# Patient Record
Sex: FEMALE | Race: BLACK OR AFRICAN AMERICAN | NOT HISPANIC OR LATINO | Employment: FULL TIME | URBAN - METROPOLITAN AREA
[De-identification: names, ages, dates, MRNs, and addresses within clinical notes are randomized per-mention and may not be internally consistent; named-entity substitution may affect disease eponyms.]

---

## 2023-02-23 PROBLEM — J34.89 NASAL OBSTRUCTION: Status: ACTIVE | Noted: 2023-02-23

## 2023-02-23 PROBLEM — D25.9 FIBROID UTERUS: Status: ACTIVE | Noted: 2023-02-23

## 2023-02-23 PROBLEM — M25.569 KNEE PAIN: Status: ACTIVE | Noted: 2023-02-23

## 2023-02-23 PROBLEM — M22.41 CHONDROMALACIA OF RIGHT PATELLA: Status: ACTIVE | Noted: 2023-02-23

## 2023-02-23 PROBLEM — R07.9 CHEST PAIN: Status: ACTIVE | Noted: 2023-02-23

## 2023-02-23 PROBLEM — R53.81 MALAISE AND FATIGUE: Status: ACTIVE | Noted: 2023-02-23

## 2023-02-23 PROBLEM — R09.A2 GLOBUS SENSATION: Status: ACTIVE | Noted: 2023-02-23

## 2023-02-23 PROBLEM — D50.9 IRON DEFICIENCY ANEMIA: Status: ACTIVE | Noted: 2023-02-23

## 2023-02-23 PROBLEM — M21.41 ACQUIRED PES PLANOVALGUS OF RIGHT FOOT: Status: ACTIVE | Noted: 2023-02-23

## 2023-02-23 PROBLEM — J30.1 SEASONAL ALLERGIC RHINITIS DUE TO POLLEN: Status: ACTIVE | Noted: 2023-02-23

## 2023-02-23 PROBLEM — K21.9 GERD (GASTROESOPHAGEAL REFLUX DISEASE): Status: ACTIVE | Noted: 2023-02-23

## 2023-02-23 PROBLEM — J01.90 ACUTE SINUSITIS: Status: ACTIVE | Noted: 2023-02-23

## 2023-02-23 PROBLEM — G25.81 RESTLESS LEG SYNDROME: Status: ACTIVE | Noted: 2023-02-23

## 2023-02-23 PROBLEM — J31.0 CHRONIC RHINITIS: Status: ACTIVE | Noted: 2023-02-23

## 2023-02-23 PROBLEM — G89.29 NECK PAIN, CHRONIC: Status: ACTIVE | Noted: 2023-02-23

## 2023-02-23 PROBLEM — M25.562 LEFT KNEE PAIN: Status: ACTIVE | Noted: 2023-02-23

## 2023-02-23 PROBLEM — J34.89 NASAL AND SINUS DISCHARGE: Status: ACTIVE | Noted: 2023-02-23

## 2023-02-23 PROBLEM — I10 ESSENTIAL HYPERTENSION: Status: ACTIVE | Noted: 2023-02-23

## 2023-02-23 PROBLEM — M54.50 LUMBAR PAIN: Status: ACTIVE | Noted: 2023-02-23

## 2023-02-23 PROBLEM — J34.89 NASAL CRUSTING: Status: ACTIVE | Noted: 2023-02-23

## 2023-02-23 PROBLEM — R10.11 ABDOMINAL PAIN, RUQ (RIGHT UPPER QUADRANT): Status: ACTIVE | Noted: 2023-02-23

## 2023-02-23 PROBLEM — E66.9 OBESITY (BMI 30-39.9): Status: ACTIVE | Noted: 2023-02-23

## 2023-02-23 PROBLEM — M25.561 RIGHT KNEE PAIN: Status: ACTIVE | Noted: 2023-02-23

## 2023-02-23 PROBLEM — M76.829 POSTERIOR TIBIAL TENDON DYSFUNCTION: Status: ACTIVE | Noted: 2023-02-23

## 2023-02-23 PROBLEM — J30.81 NON-SEASONAL ALLERGIC RHINITIS DUE TO ANIMAL HAIR AND DANDER: Status: ACTIVE | Noted: 2023-02-23

## 2023-02-23 PROBLEM — M22.42 CHONDROMALACIA OF LEFT PATELLA: Status: ACTIVE | Noted: 2023-02-23

## 2023-02-23 PROBLEM — R53.83 MALAISE AND FATIGUE: Status: ACTIVE | Noted: 2023-02-23

## 2023-02-23 PROBLEM — E66.9 CLASS 2 OBESITY WITH BODY MASS INDEX (BMI) OF 35.0 TO 35.9 IN ADULT: Status: ACTIVE | Noted: 2023-02-23

## 2023-02-23 PROBLEM — M17.12 ARTHRITIS OF KNEE, LEFT: Status: ACTIVE | Noted: 2023-02-23

## 2023-02-23 PROBLEM — M54.2 NECK PAIN, CHRONIC: Status: ACTIVE | Noted: 2023-02-23

## 2023-02-23 PROBLEM — E04.9 THYROID ENLARGED: Status: ACTIVE | Noted: 2023-02-23

## 2023-02-23 PROBLEM — E66.812 CLASS 2 OBESITY WITH BODY MASS INDEX (BMI) OF 35.0 TO 35.9 IN ADULT: Status: ACTIVE | Noted: 2023-02-23

## 2023-02-23 PROBLEM — N92.0 MENORRHAGIA: Status: ACTIVE | Noted: 2023-02-23

## 2023-02-23 PROBLEM — N76.0 VAGINITIS: Status: ACTIVE | Noted: 2023-02-23

## 2023-02-23 PROBLEM — J30.89 ALLERGIC RHINITIS DUE TO DUST MITE: Status: ACTIVE | Noted: 2023-02-23

## 2023-02-23 PROBLEM — R09.81 NASAL CONGESTION: Status: ACTIVE | Noted: 2023-02-23

## 2023-02-23 PROBLEM — N93.9 ABNORMAL UTERINE BLEEDING (AUB): Status: ACTIVE | Noted: 2023-02-23

## 2023-02-23 PROBLEM — R03.0 ELEVATED BP WITHOUT DIAGNOSIS OF HYPERTENSION: Status: ACTIVE | Noted: 2023-02-23

## 2023-02-23 PROBLEM — M21.42 ACQUIRED PES PLANOVALGUS OF LEFT FOOT: Status: ACTIVE | Noted: 2023-02-23

## 2023-02-23 PROBLEM — M21.162 ACQUIRED GENU VARUM OF LEFT LOWER EXTREMITY: Status: ACTIVE | Noted: 2023-02-23

## 2023-02-23 RX ORDER — FLUTICASONE PROPIONATE 50 MCG
2 SPRAY, SUSPENSION (ML) NASAL DAILY
COMMUNITY
Start: 2021-10-28

## 2023-02-23 RX ORDER — MAGNESIUM CARB/ALUMINUM HYDROX 105-160MG
1 TABLET,CHEWABLE ORAL EVERY 12 HOURS PRN
COMMUNITY
End: 2024-03-08 | Stop reason: SDUPTHER

## 2023-02-23 RX ORDER — SEMAGLUTIDE 1.34 MG/ML
0.25 INJECTION, SOLUTION SUBCUTANEOUS
COMMUNITY
End: 2023-03-23

## 2023-02-23 RX ORDER — HYDROCHLOROTHIAZIDE 25 MG/1
25 TABLET ORAL DAILY
COMMUNITY
End: 2023-05-26 | Stop reason: SINTOL

## 2023-02-23 RX ORDER — EPINEPHRINE 0.3 MG/.3ML
1 INJECTION SUBCUTANEOUS
COMMUNITY
Start: 2021-10-28

## 2023-03-21 ASSESSMENT — ENCOUNTER SYMPTOMS
SHORTNESS OF BREATH: 0
BLURRED VISION: 0
PND: 0
PALPITATIONS: 0
HYPERTENSION: 1
NECK PAIN: 0
ORTHOPNEA: 0
HEADACHES: 0
SWEATS: 0

## 2023-03-23 ENCOUNTER — OFFICE VISIT (OUTPATIENT)
Dept: PRIMARY CARE | Facility: CLINIC | Age: 48
End: 2023-03-23
Payer: COMMERCIAL

## 2023-03-23 ENCOUNTER — LAB (OUTPATIENT)
Dept: LAB | Facility: LAB | Age: 48
End: 2023-03-23
Payer: COMMERCIAL

## 2023-03-23 VITALS
HEART RATE: 96 BPM | SYSTOLIC BLOOD PRESSURE: 130 MMHG | RESPIRATION RATE: 16 BRPM | HEIGHT: 64 IN | DIASTOLIC BLOOD PRESSURE: 81 MMHG | WEIGHT: 207 LBS | BODY MASS INDEX: 35.34 KG/M2

## 2023-03-23 DIAGNOSIS — K21.00 GASTROESOPHAGEAL REFLUX DISEASE WITH ESOPHAGITIS WITHOUT HEMORRHAGE: ICD-10-CM

## 2023-03-23 DIAGNOSIS — I10 ESSENTIAL HYPERTENSION: ICD-10-CM

## 2023-03-23 DIAGNOSIS — R09.A2 GLOBUS SENSATION: ICD-10-CM

## 2023-03-23 DIAGNOSIS — K21.00 GASTROESOPHAGEAL REFLUX DISEASE WITH ESOPHAGITIS WITHOUT HEMORRHAGE: Primary | ICD-10-CM

## 2023-03-23 DIAGNOSIS — E66.9 OBESITY, CLASS II, BMI 35-39.9: ICD-10-CM

## 2023-03-23 PROBLEM — R03.0 ELEVATED BP WITHOUT DIAGNOSIS OF HYPERTENSION: Status: RESOLVED | Noted: 2023-02-23 | Resolved: 2023-03-23

## 2023-03-23 PROBLEM — Z91.013 FISH ALLERGY: Status: ACTIVE | Noted: 2023-03-23

## 2023-03-23 LAB
ANION GAP IN SER/PLAS: 11 MMOL/L (ref 10–20)
CALCIUM (MG/DL) IN SER/PLAS: 9.8 MG/DL (ref 8.6–10.6)
CARBON DIOXIDE, TOTAL (MMOL/L) IN SER/PLAS: 31 MMOL/L (ref 21–32)
CHLORIDE (MMOL/L) IN SER/PLAS: 99 MMOL/L (ref 98–107)
CREATININE (MG/DL) IN SER/PLAS: 0.84 MG/DL (ref 0.5–1.05)
GFR FEMALE: 86 ML/MIN/1.73M2
GLUCOSE (MG/DL) IN SER/PLAS: 97 MG/DL (ref 74–99)
POTASSIUM (MMOL/L) IN SER/PLAS: 4.3 MMOL/L (ref 3.5–5.3)
SODIUM (MMOL/L) IN SER/PLAS: 137 MMOL/L (ref 136–145)
UREA NITROGEN (MG/DL) IN SER/PLAS: 13 MG/DL (ref 6–23)

## 2023-03-23 PROCEDURE — 1036F TOBACCO NON-USER: CPT | Performed by: NURSE PRACTITIONER

## 2023-03-23 PROCEDURE — 36415 COLL VENOUS BLD VENIPUNCTURE: CPT

## 2023-03-23 PROCEDURE — 3079F DIAST BP 80-89 MM HG: CPT | Performed by: NURSE PRACTITIONER

## 2023-03-23 PROCEDURE — 3008F BODY MASS INDEX DOCD: CPT | Performed by: NURSE PRACTITIONER

## 2023-03-23 PROCEDURE — 99214 OFFICE O/P EST MOD 30 MIN: CPT | Performed by: NURSE PRACTITIONER

## 2023-03-23 PROCEDURE — 83013 H PYLORI (C-13) BREATH: CPT

## 2023-03-23 PROCEDURE — 3075F SYST BP GE 130 - 139MM HG: CPT | Performed by: NURSE PRACTITIONER

## 2023-03-23 PROCEDURE — 80048 BASIC METABOLIC PNL TOTAL CA: CPT

## 2023-03-23 RX ORDER — LANSOPRAZOLE 30 MG/1
30 TABLET, ORALLY DISINTEGRATING, DELAYED RELEASE ORAL DAILY
Qty: 30 TABLET | Refills: 1 | Status: SHIPPED | OUTPATIENT
Start: 2023-03-23 | End: 2023-05-22

## 2023-03-23 RX ORDER — MAGNESIUM CARB/ALUMINUM HYDROX 105-160MG
1 TABLET,CHEWABLE ORAL EVERY 12 HOURS
COMMUNITY
Start: 2022-11-28 | End: 2023-03-23 | Stop reason: SDUPTHER

## 2023-03-23 ASSESSMENT — ENCOUNTER SYMPTOMS
PND: 0
HYPERTENSION: 1
SHORTNESS OF BREATH: 0
SWEATS: 0
ORTHOPNEA: 0
PALPITATIONS: 0
NECK PAIN: 0
HEADACHES: 0
BLURRED VISION: 0

## 2023-03-23 ASSESSMENT — PATIENT HEALTH QUESTIONNAIRE - PHQ9
SUM OF ALL RESPONSES TO PHQ9 QUESTIONS 1 AND 2: 0
1. LITTLE INTEREST OR PLEASURE IN DOING THINGS: NOT AT ALL
2. FEELING DOWN, DEPRESSED OR HOPELESS: NOT AT ALL

## 2023-03-23 ASSESSMENT — PAIN SCALES - GENERAL: PAINLEVEL: 0-NO PAIN

## 2023-03-23 NOTE — PATIENT INSTRUCTIONS
HTN: Improved BP control. Continue hydrochlorothiazide 25mg daily. Continue to follow a low salt diet. Will reevaluate after pending uterine procedure (3-4 months?)    GERD: Trial of Prevacid 30mg daily. Can continue Gaviscon for symptom control. Avoid trigger foods and tight clothing.  H. Pylori testing ordered.   Follow up EGD if testing negative and/or unable to take Prevacid.

## 2023-03-23 NOTE — PROGRESS NOTES
"Subjective   Patient ID: Radha Mckeon is a 47 y.o. female who presents for Hypertension.    Presents for BP check and followup.  Has been impoving. Home BP readings readings 130/80 at home.  Headaches have resolved.    Has not yet had uterine embolization. Had MRI since last seen, has yet to schedule procedure.    Has noticed worsening of heartburn with globus sensation. Intermittent chest pain. Typically well controlled with Gaviscon. Not currently responding. Last EGD 2020. Was advised to repeat in few years.     Hypertension  This is a new problem. The current episode started 1 to 4 weeks ago. The problem has been rapidly improving since onset. The problem is controlled. Pertinent negatives include no anxiety, blurred vision, chest pain, headaches, malaise/fatigue, neck pain, orthopnea, palpitations, peripheral edema, PND, shortness of breath or sweats. Agents associated with hypertension include NSAIDs. Risk factors for coronary artery disease include family history, obesity and sedentary lifestyle. Compliance problems include exercise.         Review of Systems   Constitutional:  Negative for malaise/fatigue.   Eyes:  Negative for blurred vision.   Respiratory:  Negative for shortness of breath.    Cardiovascular:  Negative for chest pain, palpitations, orthopnea and PND.   Musculoskeletal:  Negative for neck pain.   Neurological:  Negative for headaches.       Objective   /81 (BP Location: Right arm, Patient Position: Sitting, BP Cuff Size: Adult)   Pulse 96   Resp 16   Ht 1.626 m (5' 4\")   Wt 93.9 kg (207 lb)   BMI 35.53 kg/m²     Physical Exam  Vitals and nursing note reviewed.   Constitutional:       General: She is not in acute distress.     Appearance: Normal appearance. She is obese. She is not ill-appearing.   Cardiovascular:      Rate and Rhythm: Normal rate and regular rhythm.      Heart sounds: Normal heart sounds. No murmur heard.  Pulmonary:      Effort: Pulmonary effort is normal. No " respiratory distress.      Breath sounds: Normal breath sounds.   Abdominal:      General: Abdomen is protuberant. Bowel sounds are normal.      Palpations: Abdomen is soft.      Tenderness: There is no abdominal tenderness. There is no guarding.   Musculoskeletal:         General: No swelling.   Lymphadenopathy:      Cervical: No cervical adenopathy.   Psychiatric:         Mood and Affect: Mood normal.         Assessment/Plan   Diagnoses and all orders for this visit:  Gastroesophageal reflux disease with esophagitis without hemorrhage  -     lansoprazole (Prevacid SoluTab) 30 mg disintegrating tablet; Take 1 tablet (30 mg) by mouth once daily. Dissolve on tongue before swallowing particles; do not chew, cut, break, or swallow whole.  -     H. Pylori Breath Test; Future  Globus sensation  -     lansoprazole (Prevacid SoluTab) 30 mg disintegrating tablet; Take 1 tablet (30 mg) by mouth once daily. Dissolve on tongue before swallowing particles; do not chew, cut, break, or swallow whole.  -     H. Pylori Breath Test; Future  Essential hypertension  -     Basic metabolic panel; Future  -     Follow Up In [facility] - PCP; Future  Obesity, Class II, BMI 35-39.9    HTN: Improved BP control. Continue hydrochlorothiazide 25mg daily. Continue to follow a low salt diet. Will reevaluate after pending uterine procedure (3-4 months?)    GERD: Trial of Prevacid 30mg daily. Can continue Gaviscon for symptom control. Avoid trigger foods and tight clothing.  H. Pylori testing ordered.   Follow up EGD if testing negative and/or unable to take Prevacid.

## 2023-03-24 LAB — H. PYLORI UBIT: NEGATIVE

## 2023-05-26 ENCOUNTER — OFFICE VISIT (OUTPATIENT)
Dept: PRIMARY CARE | Facility: CLINIC | Age: 48
End: 2023-05-26
Payer: COMMERCIAL

## 2023-05-26 VITALS
OXYGEN SATURATION: 100 % | TEMPERATURE: 97.6 F | WEIGHT: 207 LBS | HEIGHT: 64 IN | BODY MASS INDEX: 35.34 KG/M2 | DIASTOLIC BLOOD PRESSURE: 64 MMHG | SYSTOLIC BLOOD PRESSURE: 98 MMHG | HEART RATE: 71 BPM

## 2023-05-26 DIAGNOSIS — I10 ESSENTIAL HYPERTENSION, BENIGN: Primary | ICD-10-CM

## 2023-05-26 DIAGNOSIS — L30.9 DERMATITIS: ICD-10-CM

## 2023-05-26 DIAGNOSIS — I10 ESSENTIAL HYPERTENSION: ICD-10-CM

## 2023-05-26 PROCEDURE — 3078F DIAST BP <80 MM HG: CPT | Performed by: FAMILY MEDICINE

## 2023-05-26 PROCEDURE — 3008F BODY MASS INDEX DOCD: CPT | Performed by: FAMILY MEDICINE

## 2023-05-26 PROCEDURE — 3074F SYST BP LT 130 MM HG: CPT | Performed by: FAMILY MEDICINE

## 2023-05-26 PROCEDURE — 1036F TOBACCO NON-USER: CPT | Performed by: FAMILY MEDICINE

## 2023-05-26 PROCEDURE — 99214 OFFICE O/P EST MOD 30 MIN: CPT | Performed by: FAMILY MEDICINE

## 2023-05-26 RX ORDER — LOSARTAN POTASSIUM 25 MG/1
25 TABLET ORAL DAILY
Qty: 30 TABLET | Refills: 2 | Status: SHIPPED | OUTPATIENT
Start: 2023-05-26

## 2023-05-26 RX ORDER — HYDROXYZINE HYDROCHLORIDE 10 MG/1
10 TABLET, FILM COATED ORAL EVERY 8 HOURS PRN
Qty: 30 TABLET | Refills: 0 | Status: SHIPPED | OUTPATIENT
Start: 2023-05-26

## 2023-05-26 RX ORDER — TRIAMCINOLONE ACETONIDE 1 MG/G
OINTMENT TOPICAL 2 TIMES DAILY PRN
Qty: 15 G | Refills: 0 | Status: SHIPPED | OUTPATIENT
Start: 2023-05-26 | End: 2023-09-23

## 2023-05-26 NOTE — ASSESSMENT & PLAN NOTE
Suspect may be drug reaction as she noted shortly after starting hydrochlorothiazide  Stop the medication  Monitor rash  Can use Triamcinolone topically, avoid use greater than 10 days to avoid skin change  Can use hydroxyzine for nighttime pruritus, can cause drowsiness  Follow up if this fails to improve or resolve

## 2023-05-26 NOTE — PROGRESS NOTES
"Subjective   Patient ID: Radha Mckeon is a 47 y.o. female who presents for Follow-up (RASH ON ARMS AND NECK. STS THAT COULD BE FROM BP MED hydrochlorothiazide. ).    HPI   Noticed rash on forearms and left neck.  Started a couple weeks after starting hydrochlorothiazide.  NO other new medications, foods, detergents or lotions. Denies any other changes.    Of note, has not taken hydrochlorothiazide in last 2 days.    Review of Systems  Negative unless noted in HPI    Objective   BP 98/64 (BP Location: Left arm, Patient Position: Sitting)   Pulse 71   Temp 36.4 °C (97.6 °F) (Temporal)   Ht 1.626 m (5' 4.02\")   Wt 93.9 kg (207 lb)   SpO2 100%   BMI 35.51 kg/m²     Physical Exam  Constitutional:       Appearance: Normal appearance.   Skin:     Comments: Papular rash noted on right upper arm, left forearm and left neck   Neurological:      Mental Status: She is alert.         Assessment/Plan   Problem List Items Addressed This Visit          Circulatory    Essential hypertension, benign - Primary     Will have pt stop hydrochlorothiazide and monitor home Bps  If BP is >130/80 then start Losartan 25mg daily and follow up in 3 months to reassess renal function         Relevant Medications    losartan (Cozaar) 25 mg tablet       Infectious/Inflammatory    Dermatitis     Suspect may be drug reaction as she noted shortly after starting hydrochlorothiazide  Stop the medication  Monitor rash  Can use Triamcinolone topically, avoid use greater than 10 days to avoid skin change  Can use hydroxyzine for nighttime pruritus, can cause drowsiness  Follow up if this fails to improve or resolve         Relevant Medications    triamcinolone (Kenalog) 0.1 % ointment    hydrOXYzine HCL (Atarax) 10 mg tablet          "

## 2023-05-26 NOTE — ASSESSMENT & PLAN NOTE
Will have pt stop hydrochlorothiazide and monitor home Bps  If BP is >130/80 then start Losartan 25mg daily and follow up in 3 months to reassess renal function

## 2023-06-08 ENCOUNTER — HOSPITAL ENCOUNTER (OUTPATIENT)
Dept: DATA CONVERSION | Facility: HOSPITAL | Age: 48
End: 2023-06-08
Attending: RADIOLOGY | Admitting: RADIOLOGY
Payer: COMMERCIAL

## 2023-06-08 DIAGNOSIS — N93.9 ABNORMAL UTERINE AND VAGINAL BLEEDING, UNSPECIFIED: ICD-10-CM

## 2023-06-08 DIAGNOSIS — I74.8 EMBOLISM AND THROMBOSIS OF OTHER ARTERIES (MULTI): ICD-10-CM

## 2023-06-08 LAB
ANION GAP IN SER/PLAS: 11 MMOL/L (ref 10–20)
CALCIUM (MG/DL) IN SER/PLAS: 8.9 MG/DL (ref 8.6–10.3)
CARBON DIOXIDE, TOTAL (MMOL/L) IN SER/PLAS: 24 MMOL/L (ref 21–32)
CHLORIDE (MMOL/L) IN SER/PLAS: 104 MMOL/L (ref 98–107)
CREATININE (MG/DL) IN SER/PLAS: 0.9 MG/DL (ref 0.5–1.05)
ERYTHROCYTE DISTRIBUTION WIDTH (RATIO) BY AUTOMATED COUNT: 13.4 % (ref 11.5–14.5)
ERYTHROCYTE MEAN CORPUSCULAR HEMOGLOBIN CONCENTRATION (G/DL) BY AUTOMATED: 31.5 G/DL (ref 32–36)
ERYTHROCYTE MEAN CORPUSCULAR VOLUME (FL) BY AUTOMATED COUNT: 82 FL (ref 80–100)
ERYTHROCYTES (10*6/UL) IN BLOOD BY AUTOMATED COUNT: 4.82 X10E12/L (ref 4–5.2)
GFR FEMALE: 79 ML/MIN/1.73M2
GLUCOSE (MG/DL) IN SER/PLAS: 105 MG/DL (ref 74–99)
HEMATOCRIT (%) IN BLOOD BY AUTOMATED COUNT: 39.4 % (ref 36–46)
HEMOGLOBIN (G/DL) IN BLOOD: 12.4 G/DL (ref 12–16)
LEUKOCYTES (10*3/UL) IN BLOOD BY AUTOMATED COUNT: 3.6 X10E9/L (ref 4.4–11.3)
PLATELETS (10*3/UL) IN BLOOD AUTOMATED COUNT: 424 X10E9/L (ref 150–450)
POTASSIUM (MMOL/L) IN SER/PLAS: 3.9 MMOL/L (ref 3.5–5.3)
SODIUM (MMOL/L) IN SER/PLAS: 135 MMOL/L (ref 136–145)
UREA NITROGEN (MG/DL) IN SER/PLAS: 12 MG/DL (ref 6–23)

## 2023-09-30 NOTE — H&P
History of Present Illness:   Pregnant/Lactating:  ·  Are You Pregnant no   ·  Are You Currently Breastfeeding no     History Present Illness:  Reason for surgery: Uterine fibroid embolization   HPI:    47 year old Female presents for a UFE. Has a large intramural leiomyoma causing her abnormal uterine bleeding and urinary incontinence.    Allergies:        Allergies:  ·  famotidine : Throat Swelling, Hives/Urticaria  ·  Latex : Hives/Urticaria  ·  Bananas : Rash  ·  Fish : Rash    Home Medication Review:   Home Medications Reviewed: yes     Impression/Procedure:   ·  Impression and Planned Procedure: Uterine fibroid embolization       ERAS (Enhanced Recovery After Surgery):  ·  ERAS Patient: no       Physical Exam by System:    Respiratory/Thorax: Patent airways, no respiratory  distress   Cardiovascular: Regular, rate and rhythm   Gastrointestinal: Ascites, non tender   Genitourinary: Ramirez catheter   Neurological: alert and oriented x3   Skin: Warm and dry, no lesions, no rashes     Airway/Sedation Assessment:  ·  Emotional Status calm   ·  Neurologic alert & oriented x 3   ·  Respiratory clear to auscultation   ·  Cardiovascular rhythm & rate regular   ·  GI/ soft, nontender     · Pulses present: Pedal Left, Pedal Right, Radial Left, Radial Right     ·  Mouth Opening OK yes   ·  Neck Flexibility OK yes   ·  Loose Teeth no   ·  Oropharyngeal Classification Class III   ·  ASA PS Classification ASA I   ·  Sedation Plan moderate sedation     Consent:   COVID-19 Consent:  ·  COVID-19 Risk Consent Surgeon has reviewed key risks related to the risk of zee COVID-19 and if they contract COVID-19 what the risks are.       Electronic Signatures:  Fabio Edmondson (APRN-CNP)  (Signed 08-Jun-2023 09:22)   Authored: History of Present Illness, Allergies, Home  Medication Review, Impression/Procedure, ERAS, Physical Exam, Consent, Note Completion      Last Updated: 08-Jun-2023 09:22 by Fabio Edmondson (APRN-CNP)

## 2023-12-15 ENCOUNTER — APPOINTMENT (OUTPATIENT)
Dept: PRIMARY CARE | Facility: CLINIC | Age: 48
End: 2023-12-15
Payer: COMMERCIAL

## 2024-01-24 ENCOUNTER — OFFICE VISIT (OUTPATIENT)
Dept: DERMATOLOGY | Facility: CLINIC | Age: 49
End: 2024-01-24
Payer: COMMERCIAL

## 2024-01-24 DIAGNOSIS — L30.2 ID REACTION: ICD-10-CM

## 2024-01-24 DIAGNOSIS — R23.4 DESQUAMATED SKIN: Primary | ICD-10-CM

## 2024-01-24 PROCEDURE — 99203 OFFICE O/P NEW LOW 30 MIN: CPT | Performed by: DERMATOLOGY

## 2024-01-24 PROCEDURE — 1036F TOBACCO NON-USER: CPT | Performed by: DERMATOLOGY

## 2024-01-24 RX ORDER — CLOBETASOL PROPIONATE 0.5 MG/G
OINTMENT TOPICAL
Qty: 60 G | Refills: 0 | Status: SHIPPED | OUTPATIENT
Start: 2024-01-24

## 2024-01-24 ASSESSMENT — ITCH NUMERIC RATING SCALE: HOW SEVERE IS YOUR ITCHING?: 2

## 2024-01-24 NOTE — PROGRESS NOTES
Subjective     Radha Mckeon is a 48 y.o. female who presents for the following: Peeling (Feet- peeling - 1 month - pt states started on with blisters on hands but hands has resolved. Pt states has tried colloidal silver gel with partial response, oral prednisone no response, triamcinolone no response, eucrisa with partial response. ) and blisters (X 2 days to b/l arms- itchy mostly at night //Accompanied by ).     Review of Systems:  No other skin or systemic complaints other than what is documented elsewhere in the note.    The following portions of the chart were reviewed this encounter and updated as appropriate:   Tobacco  Allergies  Meds  Problems  Med Hx  Surg Hx  Fam Hx         Skin Cancer History  No skin cancer on file.      Specialty Problems          Dermatology Problems    Dermatitis        Objective   Well appearing patient in no apparent distress; mood and affect are within normal limits.    A focused skin examination was performed. All findings within normal limits unless otherwise noted below.    Assessment/Plan   1. Desquamated skin (4)  Left Foot - Posterior, Left Hand - Anterior, Right Foot - Posterior, Right Hand - Anterior  Photographs show desquamation of the palms; palms wnl on exam today.  Superficial desquamation of the soles on exam today    Patient contracted RSV followed by COVID infection a few weeks prior to palmar/sole desquamation began. Discussed this may occur following viral infection. Desquamation of palms is complete and soles is approximately detention through  -Recommend liberal emollients to the palms and the soles  -Reassurance    Related Medications  clobetasol (Temovate) 0.05 % ointment  Apply to affected active areas twice daily for up to 2 weeks, then discontinue.    2. Id reaction  Patient reports small bumps occasionally arising on the antecubital fossa/forearms; hyperpigmented macules (PIH) only today    Potential to be id reaction following RSV and COVID  infection v viral exanthem. May apply clobetasol ointment to these small bumps twice daily for 1-2 weeks if new lesions arise.   -Discussed with/information given to the patient on the risks, benefits and alternatives of the usage of topical corticosteroids, including but not limited to: atrophy (thinning of the skin), striae (stretch marks), telangiectasia (blood vessel growth), and dyspigmentation (discoloration of the skin).  -Recommend to limit long-term use of topical corticosteroids to less than 14 days per month to reduce risk of side effects.      Related Medications  clobetasol (Temovate) 0.05 % ointment  Apply to affected active areas twice daily for up to 2 weeks, then discontinue.        Follow up as needed  Discussed if there are any changes or development of concerning symptoms (lesion/skin condition is changing, bleeding, enlarging, or worsening) the patient is to contact my office. The patient verbalizes understanding.    Maria Alejandra Smith MD  1/24/2024

## 2024-03-08 DIAGNOSIS — K21.00 GASTROESOPHAGEAL REFLUX DISEASE WITH ESOPHAGITIS WITHOUT HEMORRHAGE: Primary | ICD-10-CM

## 2024-03-09 RX ORDER — MAGNESIUM CARB/ALUMINUM HYDROX 105-160MG
1 TABLET,CHEWABLE ORAL EVERY 12 HOURS PRN
Qty: 90 TABLET | Refills: 0 | Status: SHIPPED | OUTPATIENT
Start: 2024-03-09 | End: 2024-06-07

## 2024-04-19 ENCOUNTER — OFFICE VISIT (OUTPATIENT)
Dept: PRIMARY CARE | Facility: CLINIC | Age: 49
End: 2024-04-19
Payer: COMMERCIAL

## 2024-04-19 VITALS
OXYGEN SATURATION: 100 % | BODY MASS INDEX: 34.18 KG/M2 | DIASTOLIC BLOOD PRESSURE: 80 MMHG | WEIGHT: 200.2 LBS | HEART RATE: 87 BPM | HEIGHT: 64 IN | SYSTOLIC BLOOD PRESSURE: 130 MMHG

## 2024-04-19 DIAGNOSIS — M54.50 LUMBAR PAIN: Primary | ICD-10-CM

## 2024-04-19 DIAGNOSIS — R51.9 NONINTRACTABLE EPISODIC HEADACHE, UNSPECIFIED HEADACHE TYPE: ICD-10-CM

## 2024-04-19 DIAGNOSIS — R29.898 LEG HEAVINESS: ICD-10-CM

## 2024-04-19 DIAGNOSIS — J01.90 ACUTE SINUSITIS, RECURRENCE NOT SPECIFIED, UNSPECIFIED LOCATION: ICD-10-CM

## 2024-04-19 LAB
25(OH)D3 SERPL-MCNC: 16 NG/ML (ref 30–100)
ALBUMIN SERPL BCP-MCNC: 4.1 G/DL (ref 3.4–5)
ALP SERPL-CCNC: 61 U/L (ref 33–110)
ALT SERPL W P-5'-P-CCNC: 12 U/L (ref 7–45)
ANION GAP SERPL CALC-SCNC: 14 MMOL/L (ref 10–20)
AST SERPL W P-5'-P-CCNC: 11 U/L (ref 9–39)
BASOPHILS # BLD AUTO: 0.04 X10*3/UL (ref 0–0.1)
BASOPHILS NFR BLD AUTO: 1.3 %
BILIRUB SERPL-MCNC: 0.4 MG/DL (ref 0–1.2)
BUN SERPL-MCNC: 15 MG/DL (ref 6–23)
CALCIUM SERPL-MCNC: 10 MG/DL (ref 8.6–10.6)
CHLORIDE SERPL-SCNC: 105 MMOL/L (ref 98–107)
CO2 SERPL-SCNC: 24 MMOL/L (ref 21–32)
CREAT SERPL-MCNC: 0.69 MG/DL (ref 0.5–1.05)
EGFRCR SERPLBLD CKD-EPI 2021: >90 ML/MIN/1.73M*2
EOSINOPHIL # BLD AUTO: 0.05 X10*3/UL (ref 0–0.7)
EOSINOPHIL NFR BLD AUTO: 1.6 %
ERYTHROCYTE [DISTWIDTH] IN BLOOD BY AUTOMATED COUNT: 14.8 % (ref 11.5–14.5)
FERRITIN SERPL-MCNC: 13 NG/ML (ref 8–150)
GLUCOSE SERPL-MCNC: 98 MG/DL (ref 74–99)
HCT VFR BLD AUTO: 42.9 % (ref 36–46)
HGB BLD-MCNC: 13.3 G/DL (ref 12–16)
IMM GRANULOCYTES # BLD AUTO: 0 X10*3/UL (ref 0–0.7)
IMM GRANULOCYTES NFR BLD AUTO: 0 % (ref 0–0.9)
LYMPHOCYTES # BLD AUTO: 1.06 X10*3/UL (ref 1.2–4.8)
LYMPHOCYTES NFR BLD AUTO: 33.2 %
MCH RBC QN AUTO: 25 PG (ref 26–34)
MCHC RBC AUTO-ENTMCNC: 31 G/DL (ref 32–36)
MCV RBC AUTO: 81 FL (ref 80–100)
MONOCYTES # BLD AUTO: 0.49 X10*3/UL (ref 0.1–1)
MONOCYTES NFR BLD AUTO: 15.4 %
NEUTROPHILS # BLD AUTO: 1.55 X10*3/UL (ref 1.2–7.7)
NEUTROPHILS NFR BLD AUTO: 48.5 %
NRBC BLD-RTO: 0 /100 WBCS (ref 0–0)
PLATELET # BLD AUTO: 383 X10*3/UL (ref 150–450)
POTASSIUM SERPL-SCNC: 5.3 MMOL/L (ref 3.5–5.3)
PROT SERPL-MCNC: 6.7 G/DL (ref 6.4–8.2)
RBC # BLD AUTO: 5.33 X10*6/UL (ref 4–5.2)
SODIUM SERPL-SCNC: 138 MMOL/L (ref 136–145)
TSH SERPL-ACNC: <0.01 MIU/L (ref 0.44–3.98)
VIT B12 SERPL-MCNC: 329 PG/ML (ref 211–911)
WBC # BLD AUTO: 3.2 X10*3/UL (ref 4.4–11.3)

## 2024-04-19 PROCEDURE — 3075F SYST BP GE 130 - 139MM HG: CPT | Performed by: NURSE PRACTITIONER

## 2024-04-19 PROCEDURE — 85025 COMPLETE CBC W/AUTO DIFF WBC: CPT

## 2024-04-19 PROCEDURE — 82607 VITAMIN B-12: CPT

## 2024-04-19 PROCEDURE — 3079F DIAST BP 80-89 MM HG: CPT | Performed by: NURSE PRACTITIONER

## 2024-04-19 PROCEDURE — 82728 ASSAY OF FERRITIN: CPT

## 2024-04-19 PROCEDURE — 36415 COLL VENOUS BLD VENIPUNCTURE: CPT

## 2024-04-19 PROCEDURE — 84443 ASSAY THYROID STIM HORMONE: CPT

## 2024-04-19 PROCEDURE — 80053 COMPREHEN METABOLIC PANEL: CPT

## 2024-04-19 PROCEDURE — 84439 ASSAY OF FREE THYROXINE: CPT

## 2024-04-19 PROCEDURE — 82306 VITAMIN D 25 HYDROXY: CPT

## 2024-04-19 PROCEDURE — 99214 OFFICE O/P EST MOD 30 MIN: CPT | Performed by: NURSE PRACTITIONER

## 2024-04-19 RX ORDER — AMOXICILLIN AND CLAVULANATE POTASSIUM 875; 125 MG/1; MG/1
875 TABLET, FILM COATED ORAL 2 TIMES DAILY
Qty: 20 TABLET | Refills: 0 | Status: SHIPPED | OUTPATIENT
Start: 2024-04-19 | End: 2024-04-29

## 2024-04-19 ASSESSMENT — ENCOUNTER SYMPTOMS
DEPRESSION: 0
LOSS OF SENSATION IN FEET: 0
OCCASIONAL FEELINGS OF UNSTEADINESS: 0

## 2024-04-19 ASSESSMENT — PAIN SCALES - GENERAL: PAINLEVEL: 5

## 2024-04-19 NOTE — PATIENT INSTRUCTIONS
Treat for sinus infection.  Use flonase nasal spray daily  Increase fluids and rest  If not improving with treatment over the next 1-2 weeks, follow up with office.    Lower back XR ordered  We will check blood work to rule out other underlying causes.  Consider MRI if not improving.

## 2024-04-19 NOTE — PROGRESS NOTES
"Subjective   Patient ID: Radha Mckeon is a 48 y.o. female who presents for Eye Problem    Presents for very dull pain and pressure behind eyes  Feel like eyes crossing when goes to sleep  When laying on sides, goes other way  Unable to see upon waking  Heavy feeling in eye in AM  Seen by eye doctor February  Ongoing x months  Vision had changed significantly  +Nasal Congestion, headaches  Using Saline gel spray, Flonase - no change    Back pain, flaring last month  Worsening. New leg heaviness when flared. Denies any swelling in legs  Has had PT - is stretching, not always consistent  Sitting a lot - Has noted a lot more pain with standing or sitting  Today feels ok      Review of Systems    Objective     /80 (BP Location: Right arm, Patient Position: Sitting, BP Cuff Size: Adult)   Pulse 87   Ht 1.626 m (5' 4\")   Wt 90.8 kg (200 lb 3.2 oz)   SpO2 100%   BMI 34.36 kg/m²      Physical Exam  Vitals and nursing note reviewed.   Constitutional:       Appearance: She is ill-appearing. She is not toxic-appearing.   HENT:      Head: Normocephalic.      Right Ear: Ear canal and external ear normal. A middle ear effusion is present.      Left Ear: Ear canal and external ear normal. A middle ear effusion is present.      Nose: Congestion present.      Mouth/Throat:      Mouth: Mucous membranes are moist.      Pharynx: Posterior oropharyngeal erythema present.   Eyes:      Conjunctiva/sclera: Conjunctivae normal.   Cardiovascular:      Rate and Rhythm: Normal rate and regular rhythm.      Heart sounds: Normal heart sounds. No murmur heard.  Pulmonary:      Effort: Pulmonary effort is normal. No respiratory distress.      Breath sounds: Normal breath sounds.   Musculoskeletal:      Lumbar back: Spasms and tenderness present. No bony tenderness. Normal range of motion. Negative right straight leg raise test and negative left straight leg raise test.   Lymphadenopathy:      Cervical: Cervical adenopathy present. "   Skin:     Capillary Refill: Capillary refill takes less than 2 seconds.         Assessment/Plan   Diagnoses and all orders for this visit:  Lumbar pain  -     XR lumbar spine complete 4+ views; Future  -     Vitamin D 25-Hydroxy,Total (for eval of Vitamin D levels)  Acute sinusitis, recurrence not specified, unspecified location  -     amoxicillin-pot clavulanate (Augmentin) 875-125 mg tablet; Take 1 tablet (875 mg) by mouth 2 times a day for 10 days.  Nonintractable episodic headache, unspecified headache type  -     CBC and Auto Differential  -     Comprehensive metabolic panel  -     Ferritin  -     Vitamin B12  Leg heaviness  -     CBC and Auto Differential  -     Comprehensive metabolic panel  -     Ferritin  -     Vitamin B12  -     TSH with reflex to Free T4 if abnormal  -     Thyroxine, Free      Patient Instructions   Treat for sinus infection.  Use flonase nasal spray daily  Increase fluids and rest  If not improving with treatment over the next 1-2 weeks, follow up with office.    Lower back XR ordered  We will check blood work to rule out other underlying causes.  Consider MRI if not improving.

## 2024-04-20 LAB — T4 FREE SERPL-MCNC: 1.7 NG/DL (ref 0.78–1.48)

## 2024-04-22 DIAGNOSIS — E55.9 VITAMIN D DEFICIENCY: Primary | ICD-10-CM

## 2024-04-22 DIAGNOSIS — E05.90 HYPERTHYROIDISM: ICD-10-CM

## 2024-04-22 RX ORDER — ERGOCALCIFEROL 1.25 MG/1
50000 CAPSULE ORAL
Qty: 12 CAPSULE | Refills: 0 | Status: SHIPPED | OUTPATIENT
Start: 2024-04-28 | End: 2024-07-21

## 2024-05-03 ENCOUNTER — HOSPITAL ENCOUNTER (OUTPATIENT)
Dept: RADIOLOGY | Facility: HOSPITAL | Age: 49
Discharge: HOME | End: 2024-05-03
Payer: COMMERCIAL

## 2024-05-03 DIAGNOSIS — M54.50 LUMBAR PAIN: ICD-10-CM

## 2024-05-03 PROCEDURE — 72110 X-RAY EXAM L-2 SPINE 4/>VWS: CPT | Performed by: RADIOLOGY

## 2024-05-03 PROCEDURE — 72110 X-RAY EXAM L-2 SPINE 4/>VWS: CPT

## 2024-05-29 DIAGNOSIS — B37.9 ANTIBIOTIC-INDUCED YEAST INFECTION: Primary | ICD-10-CM

## 2024-05-29 DIAGNOSIS — T36.95XA ANTIBIOTIC-INDUCED YEAST INFECTION: Primary | ICD-10-CM

## 2024-05-29 RX ORDER — FLUCONAZOLE 150 MG/1
150 TABLET ORAL ONCE
Qty: 1 TABLET | Refills: 0 | Status: SHIPPED | OUTPATIENT
Start: 2024-05-29 | End: 2024-05-29

## 2024-06-14 ENCOUNTER — OFFICE VISIT (OUTPATIENT)
Dept: ENDOCRINOLOGY | Facility: CLINIC | Age: 49
End: 2024-06-14
Payer: COMMERCIAL

## 2024-06-14 VITALS
HEIGHT: 64 IN | WEIGHT: 204 LBS | SYSTOLIC BLOOD PRESSURE: 148 MMHG | HEART RATE: 70 BPM | DIASTOLIC BLOOD PRESSURE: 97 MMHG | BODY MASS INDEX: 34.83 KG/M2

## 2024-06-14 DIAGNOSIS — E05.90 HYPERTHYROIDISM: Primary | ICD-10-CM

## 2024-06-14 DIAGNOSIS — E04.2 MULTINODULAR GOITER: ICD-10-CM

## 2024-06-14 PROCEDURE — 99204 OFFICE O/P NEW MOD 45 MIN: CPT | Performed by: STUDENT IN AN ORGANIZED HEALTH CARE EDUCATION/TRAINING PROGRAM

## 2024-06-14 PROCEDURE — 3080F DIAST BP >= 90 MM HG: CPT | Performed by: STUDENT IN AN ORGANIZED HEALTH CARE EDUCATION/TRAINING PROGRAM

## 2024-06-14 PROCEDURE — 1036F TOBACCO NON-USER: CPT | Performed by: STUDENT IN AN ORGANIZED HEALTH CARE EDUCATION/TRAINING PROGRAM

## 2024-06-14 PROCEDURE — 3077F SYST BP >= 140 MM HG: CPT | Performed by: STUDENT IN AN ORGANIZED HEALTH CARE EDUCATION/TRAINING PROGRAM

## 2024-06-14 ASSESSMENT — PAIN SCALES - GENERAL: PAINLEVEL: 0-NO PAIN

## 2024-06-14 NOTE — PATIENT INSTRUCTIONS
Get your thryoid labs done    Schedule your thryoid ultrasound:    Treatment for your hyperthyroidism consists of:   Medication called methimazole vs. Radioactive iodine    Follow up next available    Hillary Roy MD  Divison of Endocrinology   [facility]   Phone: [phone]    option 4, then option 1  Fax: [phone]

## 2024-06-14 NOTE — PROGRESS NOTES
48 F PMH:    Coming in today for thyroid evaluation, referred by PCP     Having symptoms of fatigue, hair thinning, brain fog, decreased vision, lower extremity swelling so PCP did some lab work up    Labs in 4/2024 showing a suppressed TSH and a frankly elevated FT4, prior to that labs in 2022 showing normal TFT     Had RSV then COVID late last year     Ultrasound was done back in 2022 showing MNG with a right sided dominant nodule 1.8cm isoechoic     No previous FNA in the past   No previous thyroid treatment     GYN:  Regular periods until last month, delayed  No pregnancies in the past, no plans for future pregnancy    Home BP monitoring at home is normal     Famhx-father- hemithyrodectomy and sister had ttx    Past Medical History:   Diagnosis Date    Eructation 07/23/2020    Belching    Other chest pain 07/23/2020    Atypical chest pain      Social History     Socioeconomic History    Marital status:      Spouse name: Not on file    Number of children: Not on file    Years of education: Not on file    Highest education level: Not on file   Occupational History    Not on file   Tobacco Use    Smoking status: Never    Smokeless tobacco: Never   Substance and Sexual Activity    Alcohol use: Yes     Alcohol/week: 1.0 standard drink of alcohol     Types: 1 Glasses of wine per week     Comment: occ    Drug use: Never    Sexual activity: Not on file   Other Topics Concern    Not on file   Social History Narrative    Not on file     Social Determinants of Health     Financial Resource Strain: Not on file   Food Insecurity: Not on file   Transportation Needs: Not on file   Physical Activity: Not on file   Stress: Not on file   Social Connections: Not on file   Intimate Partner Violence: Not on file   Housing Stability: Not on file      Family History   Problem Relation Name Age of Onset    Other (cerebrovascular accident (CVA)) Father      Diabetes Father      Thyroid disease Father      Thyroid disease Sister         Fatigue  Night sweats  Hot flashes   ROS reviewed and is negative except for pertinent findings noted on HPI    Physical Exam  Constitutional:       Appearance: Normal appearance.   HENT:      Head: Normocephalic.   Neck:      Comments: Goiter with nodular gland, 2cm right sided nodule  Cardiovascular:      Rate and Rhythm: Normal rate and regular rhythm.   Abdominal:      Palpations: Abdomen is soft.   Musculoskeletal:         General: No swelling or tenderness.   Skin:     Comments: Thinned skin   Neurological:      General: No focal deficit present.      Mental Status: She is alert and oriented to person, place, and time.      Comments: No tremors Brisk DTR         labs and imaging reviewed, pertinent findings listed on HPI and Impression      Problem List Items Addressed This Visit       Hyperthyroidism - Primary    Relevant Orders    Thyroid Stimulating Hormone    Thyroxine, Free    Triiodothyronine, Total    US thyroid    Thyroid Peroxidase (TPO) Antibody    Thyroid Stimulating Immunoglobulin    Hepatic Function Panel    Multinodular goiter    Relevant Orders    US thyroid      Hyperthyroidism from Graves vs toxic nodule     Clinically hyperthyroid today    No heart symptoms to can defer beta blocker   TFTs with antibodies  Repeat thyroid ultrasound  May need thyroid uptake scan     Briefly discussed treatment options including Mmi vs CHONG     Follow up next available

## 2024-06-20 ENCOUNTER — LAB (OUTPATIENT)
Dept: LAB | Facility: LAB | Age: 49
End: 2024-06-20
Payer: COMMERCIAL

## 2024-06-20 DIAGNOSIS — E05.90 HYPERTHYROIDISM: ICD-10-CM

## 2024-06-20 LAB
ALBUMIN SERPL BCP-MCNC: 4.3 G/DL (ref 3.4–5)
ALP SERPL-CCNC: 73 U/L (ref 33–110)
ALT SERPL W P-5'-P-CCNC: 11 U/L (ref 7–45)
AST SERPL W P-5'-P-CCNC: 11 U/L (ref 9–39)
BILIRUB DIRECT SERPL-MCNC: 0 MG/DL (ref 0–0.3)
BILIRUB SERPL-MCNC: 0.4 MG/DL (ref 0–1.2)
PROT SERPL-MCNC: 6.8 G/DL (ref 6.4–8.2)
T3 SERPL-MCNC: 95 NG/DL (ref 60–200)
T4 FREE SERPL-MCNC: 0.94 NG/DL (ref 0.78–1.48)
THYROPEROXIDASE AB SERPL-ACNC: 122 IU/ML
TSH SERPL-ACNC: 1.15 MIU/L (ref 0.44–3.98)

## 2024-06-20 PROCEDURE — 84445 ASSAY OF TSI GLOBULIN: CPT

## 2024-06-20 PROCEDURE — 86376 MICROSOMAL ANTIBODY EACH: CPT

## 2024-06-20 PROCEDURE — 84439 ASSAY OF FREE THYROXINE: CPT

## 2024-06-20 PROCEDURE — 36415 COLL VENOUS BLD VENIPUNCTURE: CPT

## 2024-06-20 PROCEDURE — 84443 ASSAY THYROID STIM HORMONE: CPT

## 2024-06-20 PROCEDURE — 80076 HEPATIC FUNCTION PANEL: CPT

## 2024-06-20 PROCEDURE — 84480 ASSAY TRIIODOTHYRONINE (T3): CPT

## 2024-06-26 ENCOUNTER — HOSPITAL ENCOUNTER (OUTPATIENT)
Dept: RADIOLOGY | Facility: HOSPITAL | Age: 49
Discharge: HOME | End: 2024-06-26
Payer: COMMERCIAL

## 2024-06-26 DIAGNOSIS — E04.2 MULTINODULAR GOITER: ICD-10-CM

## 2024-06-26 DIAGNOSIS — E05.90 HYPERTHYROIDISM: ICD-10-CM

## 2024-06-26 PROCEDURE — 76536 US EXAM OF HEAD AND NECK: CPT | Performed by: STUDENT IN AN ORGANIZED HEALTH CARE EDUCATION/TRAINING PROGRAM

## 2024-06-26 PROCEDURE — 76536 US EXAM OF HEAD AND NECK: CPT

## 2024-06-27 LAB — TSI SER-ACNC: <1 TSI INDEX

## 2024-07-08 DIAGNOSIS — E04.1 THYROID NODULE: ICD-10-CM

## 2024-07-08 DIAGNOSIS — E04.2 MULTINODULAR GOITER: Primary | ICD-10-CM

## 2024-07-16 DIAGNOSIS — E55.9 VITAMIN D DEFICIENCY: ICD-10-CM

## 2024-07-17 RX ORDER — ERGOCALCIFEROL 1.25 MG/1
50000 CAPSULE ORAL
Qty: 12 CAPSULE | Refills: 0 | Status: SHIPPED | OUTPATIENT
Start: 2024-07-21 | End: 2024-10-13

## 2024-07-29 ENCOUNTER — HOSPITAL ENCOUNTER (OUTPATIENT)
Dept: RADIOLOGY | Facility: HOSPITAL | Age: 49
Discharge: HOME | End: 2024-07-29
Payer: COMMERCIAL

## 2024-07-29 VITALS
SYSTOLIC BLOOD PRESSURE: 153 MMHG | OXYGEN SATURATION: 100 % | DIASTOLIC BLOOD PRESSURE: 87 MMHG | HEART RATE: 67 BPM | RESPIRATION RATE: 18 BRPM | TEMPERATURE: 98.3 F

## 2024-07-29 DIAGNOSIS — E04.2 MULTINODULAR GOITER: ICD-10-CM

## 2024-07-29 DIAGNOSIS — E04.1 THYROID NODULE: ICD-10-CM

## 2024-07-29 PROCEDURE — 2500000005 HC RX 250 GENERAL PHARMACY W/O HCPCS

## 2024-07-29 PROCEDURE — 76942 ECHO GUIDE FOR BIOPSY: CPT

## 2024-07-29 PROCEDURE — 88112 CYTOPATH CELL ENHANCE TECH: CPT | Mod: TC | Performed by: STUDENT IN AN ORGANIZED HEALTH CARE EDUCATION/TRAINING PROGRAM

## 2024-07-29 RX ORDER — LIDOCAINE HYDROCHLORIDE 10 MG/ML
INJECTION, SOLUTION EPIDURAL; INFILTRATION; INTRACAUDAL; PERINEURAL
Status: COMPLETED | OUTPATIENT
Start: 2024-07-29 | End: 2024-07-29

## 2024-07-29 ASSESSMENT — PAIN SCALES - GENERAL
PAINLEVEL_OUTOF10: 0 - NO PAIN
PAINLEVEL_OUTOF10: 0 - NO PAIN

## 2024-07-29 NOTE — POST-PROCEDURE NOTE
Interventional Radiology Brief Postprocedure Note    Attending: Fabio Edmondson CNP    Assistant: none    Diagnosis: right sided thyroid nodule    Description of procedure: A Fine Needle Aspiration was preformed of the 2.7 cm nodule of the right lower lobe of the thyroid.       Anesthesia:  Local    Complications: None    Estimated Blood Loss: none    Medications (Filter: Administrations occurring from 1345 to 1412 on 07/29/24) As of 07/29/24 1412      lidocaine PF (Xylocaine) 10 mg/mL (1 %) injection (mL) Total volume:  10 mL      Date/Time Rate/Dose/Volume Action       07/29/24  1403 10 mL Given                   ID Type Source Tests Collected by Time   1 : Right Inferior Lobe Thyroid Nodule Fine Needle Aspiration Non-Gynecologic Cytology THYROID FINE NEEDLE ASPIRATION RIGHT INFERIOR LOBE CYTOLOGY CONSULTATION (NON-GYNECOLOGIC) Fabio Edmondson, LUKE-CNP 7/29/2024 1406         See detailed result report with images in PACS.    The patient tolerated the procedure well without incident or complication and is in stable condition.

## 2024-07-29 NOTE — PRE-PROCEDURE NOTE
Interventional Radiology Preprocedure Note    Indication for procedure: Diagnoses of Multinodular goiter and Thyroid nodule were pertinent to this visit.    Relevant review of systems: Negative for fever or chills. Respiratory:  Negative for shortness of breath. Cardiovascular:  Negative for chest pain or palpitations. Gastrointestinal:  Positive for abdominal distention, Negative for abdominal pain, blood in stool, constipation, diarrhea, nausea and vomiting. Neurological: Negative for confusion.       Relevant Labs:   Lab Results   Component Value Date    CREATININE 0.69 04/19/2024    EGFR >90 04/19/2024    INR 1.0 07/11/2020    PROTIME 11.9 07/11/2020       Planned Sedation/Anesthesia: local    Airway assessment: normal    Directed physical examination:      Constitutional:       General: She is not in acute distress.  Cardiovascular:      Rate and Rhythm: Normal rate and regular rhythm.   Pulmonary:      Effort: Pulmonary effort is normal. No respiratory distress.      Breath sounds: WNL  Abdominal:      General: Abdomen is flat. Bowel sounds are normal. There is no distension.      Palpations: Abdomen is soft, nontender, BS+  Neurological:      Mental Status: She is alert and oriented    Benefits, risks and alternatives of procedure and planned sedation have been discussed with the patient and/or their representative. All questions answered and they agree to proceed.

## 2024-07-31 LAB
LABORATORY COMMENT REPORT: NORMAL
LABORATORY COMMENT REPORT: NORMAL
PATH REPORT.FINAL DX SPEC: NORMAL
PATH REPORT.GROSS SPEC: NORMAL
PATH REPORT.TOTAL CANCER: NORMAL

## 2024-08-02 DIAGNOSIS — E04.1 THYROID NODULE: ICD-10-CM

## 2024-08-02 DIAGNOSIS — E04.2 MULTINODULAR GOITER: Primary | ICD-10-CM

## 2024-08-02 LAB — TEST COMMENT - SURGICAL SENDOUT REQUEST: NORMAL

## 2024-08-02 NOTE — RESULT ENCOUNTER NOTE
Discussed results with patient along with options for repeat fna vs. Testing, will proceed with molecular testing

## 2024-08-30 ENCOUNTER — APPOINTMENT (OUTPATIENT)
Dept: ENDOCRINOLOGY | Facility: CLINIC | Age: 49
End: 2024-08-30
Payer: COMMERCIAL

## 2024-09-09 LAB
AP SUMMARY REPORT: NORMAL
SCAN RESULT: NORMAL

## 2024-10-23 ENCOUNTER — APPOINTMENT (OUTPATIENT)
Dept: ENDOCRINOLOGY | Facility: CLINIC | Age: 49
End: 2024-10-23
Payer: COMMERCIAL

## 2024-11-13 ENCOUNTER — HOSPITAL ENCOUNTER (OUTPATIENT)
Dept: RADIOLOGY | Facility: CLINIC | Age: 49
Discharge: HOME | End: 2024-11-13
Payer: COMMERCIAL

## 2024-11-13 VITALS — BODY MASS INDEX: 34.82 KG/M2 | WEIGHT: 203.93 LBS | HEIGHT: 64 IN

## 2024-11-13 DIAGNOSIS — Z12.31 SCREENING MAMMOGRAM FOR BREAST CANCER: ICD-10-CM

## 2024-11-13 PROCEDURE — 77067 SCR MAMMO BI INCL CAD: CPT

## 2024-11-13 PROCEDURE — 77063 BREAST TOMOSYNTHESIS BI: CPT | Performed by: RADIOLOGY

## 2024-11-13 PROCEDURE — 77067 SCR MAMMO BI INCL CAD: CPT | Performed by: RADIOLOGY

## 2024-12-19 ENCOUNTER — APPOINTMENT (OUTPATIENT)
Dept: ENDOCRINOLOGY | Facility: CLINIC | Age: 49
End: 2024-12-19
Payer: COMMERCIAL

## 2024-12-19 NOTE — PROGRESS NOTES
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  Molecular testing was negative    GYN:  Regular periods until last month, delayed  No pregnancies in the past, no plans for future pregnancy    Home BP monitoring at home is normal     Famhx-father- hemithyrodectomy and sister had ttx    Past Medical History:   Diagnosis Date    Eructation 07/23/2020    Belching    Other chest pain 07/23/2020    Atypical chest pain      Social History     Socioeconomic History    Marital status:      Spouse name: Not on file    Number of children: Not on file    Years of education: Not on file    Highest education level: Not on file   Occupational History    Not on file   Tobacco Use    Smoking status: Never    Smokeless tobacco: Never   Substance and Sexual Activity    Alcohol use: Yes     Alcohol/week: 1.0 standard drink of alcohol     Types: 1 Glasses of wine per week     Comment: occ    Drug use: Never    Sexual activity: Not on file   Other Topics Concern    Not on file   Social History Narrative    Not on file     Social Drivers of Health     Financial Resource Strain: Not on file   Food Insecurity: Not on file   Transportation Needs: Not on file   Physical Activity: Not on file   Stress: Not on file   Social Connections: Not on file   Intimate Partner Violence: Not on file   Housing Stability: Not on file      Family History   Problem Relation Name Age of Onset    Other (cerebrovascular accident (CVA)) Father       Diabetes Father      Thyroid disease Father      Thyroid disease Sister        Fatigue  Night sweats  Hot flashes   ROS reviewed and is negative except for pertinent findings noted on HPI    Physical Exam  Constitutional:       Appearance: Normal appearance.   HENT:      Head: Normocephalic.   Neck:      Comments: Goiter with nodular gland, 2cm right sided nodule  Cardiovascular:      Rate and Rhythm: Normal rate and regular rhythm.   Abdominal:      Palpations: Abdomen is soft.   Musculoskeletal:         General: No swelling or tenderness.   Skin:     Comments: Thinned skin   Neurological:      General: No focal deficit present.      Mental Status: She is alert and oriented to person, place, and time.      Comments: No tremors Brisk DTR         labs and imaging reviewed, pertinent findings listed on HPI and Impression      Problem List Items Addressed This Visit    None       Thyroiditis, resolved   Thyroid nodule    Repeat thyroid ultrasound in 7/2024   Repeat TFTs now    Follow up in the summer after ultrasound

## 2025-01-24 DIAGNOSIS — R09.A2 GLOBUS SENSATION: ICD-10-CM

## 2025-01-24 DIAGNOSIS — K21.00 GASTROESOPHAGEAL REFLUX DISEASE WITH ESOPHAGITIS WITHOUT HEMORRHAGE: ICD-10-CM

## 2025-01-24 RX ORDER — MAGNESIUM CARB/ALUMINUM HYDROX 105-160MG
1 TABLET,CHEWABLE ORAL EVERY 12 HOURS PRN
Qty: 90 TABLET | Refills: 0 | Status: SHIPPED | OUTPATIENT
Start: 2025-01-24 | End: 2025-04-24

## 2025-01-24 RX ORDER — LANSOPRAZOLE 30 MG/1
30 TABLET, ORALLY DISINTEGRATING, DELAYED RELEASE ORAL DAILY
Qty: 30 TABLET | Refills: 1 | Status: SHIPPED | OUTPATIENT
Start: 2025-01-24 | End: 2025-03-25

## 2025-01-26 ENCOUNTER — OFFICE VISIT (OUTPATIENT)
Dept: URGENT CARE | Age: 50
End: 2025-01-26
Payer: COMMERCIAL

## 2025-01-26 VITALS
SYSTOLIC BLOOD PRESSURE: 143 MMHG | TEMPERATURE: 98.7 F | OXYGEN SATURATION: 98 % | DIASTOLIC BLOOD PRESSURE: 88 MMHG | HEART RATE: 78 BPM

## 2025-01-26 DIAGNOSIS — J01.90 ACUTE BACTERIAL SINUSITIS: Primary | ICD-10-CM

## 2025-01-26 DIAGNOSIS — B96.89 ACUTE BACTERIAL SINUSITIS: Primary | ICD-10-CM

## 2025-01-26 PROCEDURE — 3077F SYST BP >= 140 MM HG: CPT | Performed by: NURSE PRACTITIONER

## 2025-01-26 PROCEDURE — 99070 SPECIAL SUPPLIES PHYS/QHP: CPT | Performed by: NURSE PRACTITIONER

## 2025-01-26 PROCEDURE — 3079F DIAST BP 80-89 MM HG: CPT | Performed by: NURSE PRACTITIONER

## 2025-01-26 PROCEDURE — 1036F TOBACCO NON-USER: CPT | Performed by: NURSE PRACTITIONER

## 2025-01-26 PROCEDURE — 99213 OFFICE O/P EST LOW 20 MIN: CPT | Performed by: NURSE PRACTITIONER

## 2025-01-26 RX ORDER — AMOXICILLIN AND CLAVULANATE POTASSIUM 875; 125 MG/1; MG/1
875 TABLET, FILM COATED ORAL 2 TIMES DAILY
Qty: 14 TABLET | Refills: 0 | Status: SHIPPED | OUTPATIENT
Start: 2025-01-26 | End: 2025-02-02

## 2025-01-26 ASSESSMENT — ENCOUNTER SYMPTOMS
COUGH: 1
MYALGIAS: 0
CHILLS: 0
SORE THROAT: 1
EYE ITCHING: 0
FATIGUE: 1
FACIAL SWELLING: 0
EYE DISCHARGE: 0
APPETITE CHANGE: 0
FEVER: 0
LIGHT-HEADEDNESS: 0
SHORTNESS OF BREATH: 0
SINUS PAIN: 1
ABDOMINAL PAIN: 0
EYE PAIN: 0
WHEEZING: 0
RHINORRHEA: 0
CHEST TIGHTNESS: 0
SINUS PRESSURE: 1
DIZZINESS: 0
TROUBLE SWALLOWING: 0
ACTIVITY CHANGE: 0

## 2025-01-26 NOTE — PROGRESS NOTES
Subjective   Patient ID: Radha Mckeon is a 49 y.o. female. They present today with a chief complaint of chest congestion (Pt states x3 weeks chest congestion, says a feels a tickle in chest. When laying back feels chest tightness).    History of Present Illness    History provided by:  Patient      Past Medical History  Allergies as of 01/26/2025 - Reviewed 01/26/2025   Allergen Reaction Noted    Famotidine Swelling 02/23/2023    Pantoprazole Swelling 02/23/2023    Banana Rash 03/23/2023    Fish containing products Rash 10/30/2002    Latex Rash 07/09/2002       (Not in a hospital admission)       Past Medical History:   Diagnosis Date    Eructation 07/23/2020    Belching    Other chest pain 07/23/2020    Atypical chest pain       Past Surgical History:   Procedure Laterality Date    IR ANGIOGRAM INFERIOR EPIGASTRIC  6/8/2023    IR ANGIOGRAM INFERIOR EPIGASTRIC 6/8/2023 [facility]    IR ANGIOGRAM INFERIOR EPIGASTRIC PELVIC  6/8/2023    IR ANGIOGRAM INFERIOR EPIGASTRIC PELVIC 6/8/2023 [facility]    OTHER SURGICAL HISTORY  04/01/2021    No history of surgery        reports that she has never smoked. She has never used smokeless tobacco. She reports current alcohol use of about 1.0 standard drink of alcohol per week. She reports that she does not use drugs.    Review of Systems  Review of Systems   Constitutional:  Positive for fatigue. Negative for activity change, appetite change, chills and fever.   HENT:  Positive for congestion, postnasal drip, sinus pressure, sinus pain and sore throat. Negative for dental problem, drooling, ear discharge, ear pain, facial swelling, nosebleeds, rhinorrhea, sneezing and trouble swallowing.    Eyes:  Negative for pain, discharge and itching.   Respiratory:  Positive for cough. Negative for chest tightness, shortness of breath and wheezing.    Gastrointestinal:  Negative for abdominal pain.   Musculoskeletal:  Negative for myalgias.   Neurological:  Negative for dizziness and  light-headedness.                                  Objective    Vitals:    01/26/25 1429   BP: 143/88   Pulse: 78   Temp: 37.1 °C (98.7 °F)   SpO2: 98%     No LMP recorded.    Physical Exam  Vitals reviewed.   Constitutional:       General: She is not in acute distress.     Appearance: Normal appearance.   HENT:      Right Ear: A middle ear effusion is present.      Left Ear: A middle ear effusion is present.      Nose: Congestion and rhinorrhea present.      Right Turbinates: Swollen.      Left Turbinates: Swollen.      Right Sinus: Frontal sinus tenderness present.      Left Sinus: Frontal sinus tenderness present.      Mouth/Throat:      Pharynx: No oropharyngeal exudate or posterior oropharyngeal erythema.   Eyes:      Conjunctiva/sclera: Conjunctivae normal.   Cardiovascular:      Rate and Rhythm: Normal rate and regular rhythm.   Pulmonary:      Effort: Pulmonary effort is normal.      Breath sounds: Normal breath sounds.   Skin:     General: Skin is warm and dry.   Neurological:      General: No focal deficit present.      Mental Status: She is alert.         Procedures    Point of Care Test & Imaging Results from this visit  No results found for this visit on 01/26/25.   No results found.    Diagnostic study results (if any) were reviewed by ANNA George.    Assessment/Plan   Allergies, medications, history, and pertinent labs/EKGs/Imaging reviewed by ANNA George.     Medical Decision Making  History and examination consistent with acute uncomplicated sinusitis. No indication for labs or imaging at this time. No evidence of sepsis, strep pharyngitis, or pneumonia. Counseled patient/family on antibiotic treatment and supportive measures at home. Patient advised to return to clinic or present to ED if symptoms change or worsen. Otherwise follow-up with PCP. Patient verbalized understanding and agrees with plan.       Orders and Diagnoses  Diagnoses and all orders for this  visit:  Acute bacterial sinusitis  -     amoxicillin-pot clavulanate (Augmentin) 875-125 mg tablet; Take 1 tablet (875 mg) by mouth 2 times a day for 7 days.      Medical Admin Record      Patient disposition: Home    Electronically signed by ANNA George  2:50 PM

## 2025-02-02 ASSESSMENT — ENCOUNTER SYMPTOMS
NAUSEA: 0
BACK PAIN: 0
CONSTIPATION: 0
NERVOUS/ANXIOUS: 0
ABDOMINAL PAIN: 0
COUGH: 0
BLOOD IN STOOL: 0
APPETITE CHANGE: 0
FATIGUE: 0
SHORTNESS OF BREATH: 0
VOMITING: 0
UNEXPECTED WEIGHT CHANGE: 0
DYSURIA: 0
MYALGIAS: 0
HEADACHES: 0
FREQUENCY: 0
PALPITATIONS: 0
DIARRHEA: 0

## 2025-02-03 ENCOUNTER — APPOINTMENT (OUTPATIENT)
Dept: PRIMARY CARE | Facility: CLINIC | Age: 50
End: 2025-02-03
Payer: COMMERCIAL

## 2025-02-03 VITALS
OXYGEN SATURATION: 97 % | HEIGHT: 64 IN | WEIGHT: 212.2 LBS | SYSTOLIC BLOOD PRESSURE: 124 MMHG | HEART RATE: 79 BPM | DIASTOLIC BLOOD PRESSURE: 84 MMHG | TEMPERATURE: 97.6 F | BODY MASS INDEX: 36.23 KG/M2

## 2025-02-03 DIAGNOSIS — K21.00 GASTROESOPHAGEAL REFLUX DISEASE WITH ESOPHAGITIS WITHOUT HEMORRHAGE: ICD-10-CM

## 2025-02-03 DIAGNOSIS — E04.2 MULTINODULAR GOITER: ICD-10-CM

## 2025-02-03 DIAGNOSIS — Z12.4 PAP SMEAR FOR CERVICAL CANCER SCREENING: ICD-10-CM

## 2025-02-03 DIAGNOSIS — N92.6 IRREGULAR MENSES: ICD-10-CM

## 2025-02-03 DIAGNOSIS — Z00.00 ROUTINE GENERAL MEDICAL EXAMINATION AT A HEALTH CARE FACILITY: Primary | ICD-10-CM

## 2025-02-03 DIAGNOSIS — B96.89 BACTERIAL VAGINITIS: ICD-10-CM

## 2025-02-03 DIAGNOSIS — N76.0 BACTERIAL VAGINITIS: ICD-10-CM

## 2025-02-03 DIAGNOSIS — Z11.51 SCREENING FOR HUMAN PAPILLOMAVIRUS (HPV): ICD-10-CM

## 2025-02-03 DIAGNOSIS — D50.9 IRON DEFICIENCY ANEMIA, UNSPECIFIED IRON DEFICIENCY ANEMIA TYPE: ICD-10-CM

## 2025-02-03 LAB
POC CLUE CELL WET PREP: PRESENT
POC TRICHOMONAS WET PREP: ABNORMAL
POC WBC WET PREP: ABNORMAL
POC YEAST CELLS WET PREP: ABNORMAL

## 2025-02-03 PROCEDURE — 87624 HPV HI-RISK TYP POOLED RSLT: CPT

## 2025-02-03 PROCEDURE — 3074F SYST BP LT 130 MM HG: CPT | Performed by: NURSE PRACTITIONER

## 2025-02-03 PROCEDURE — 88141 CYTOPATH C/V INTERPRET: CPT | Performed by: PATHOLOGY

## 2025-02-03 PROCEDURE — 3008F BODY MASS INDEX DOCD: CPT | Performed by: NURSE PRACTITIONER

## 2025-02-03 PROCEDURE — 3079F DIAST BP 80-89 MM HG: CPT | Performed by: NURSE PRACTITIONER

## 2025-02-03 PROCEDURE — 88175 CYTOPATH C/V AUTO FLUID REDO: CPT

## 2025-02-03 PROCEDURE — 87210 SMEAR WET MOUNT SALINE/INK: CPT | Performed by: NURSE PRACTITIONER

## 2025-02-03 PROCEDURE — 99396 PREV VISIT EST AGE 40-64: CPT | Performed by: NURSE PRACTITIONER

## 2025-02-03 RX ORDER — CLINDAMYCIN HYDROCHLORIDE 300 MG/1
300 CAPSULE ORAL 2 TIMES DAILY
Qty: 14 CAPSULE | Refills: 0 | Status: SHIPPED | OUTPATIENT
Start: 2025-02-03 | End: 2025-02-10

## 2025-02-03 ASSESSMENT — PAIN SCALES - GENERAL: PAINLEVEL_OUTOF10: 0-NO PAIN

## 2025-02-03 NOTE — PATIENT INSTRUCTIONS
It is important that you have yearly check-ups with your healthcare provider to ensure that you stay up to date on your health, screenings, and vaccinations, even if you feel healthy.     Make sure you eat a diet rich in fruits, vegetables, nuts, beans, whole grains, lean meat, eggs, calcium, and good fats (i.e. olive oil, avocado baked fish). AVOID a diet that is high in red meat, trans- and saturated fats, sweetened beverages, and refined grains (i.e. white bread, rice, sweetened cereals).     Stay hydrated with at least 64 ounces of water daily.    Exercise most days per week, for at least 30-45 minutes per session.     Be sure to wear sunscreen of SPF of 15 or higher if you are going to be outside.    Stress management and coping skills are important to manage our stress levels. Some tips include keep in mind that stress isn't a bad thing, talk about your problems, prioritize your responsibilities, focus on the basics, don't put all your eggs in one basket, set aside time for yourself, and keep things in perspective.     Stay up to date with annual eye exams and twice yearly dental exams.    Recommend annual flu vaccination, in addition to age appropriate recommended vaccines.    PAP smear is recommended every 2-3 years for women 21-29 and every 3-5 years for women 30-65.  Updated in office today    Annual mammogram is recommended for women 40-75  UTD    Colon cancer screening based on age, family history, and other risk factors.  Due in November    Treat for BV today  If cycles not improving, can be related to uterine fibroids    Fasting blood work today    Refer to Dr. Easley to consult on goiter options    Follow up 6 months for evaluation of therapy, sooner as needed   necrotizing fasciitis

## 2025-02-03 NOTE — PROGRESS NOTES
Subjective   Radha Mckeon is a 49 y.o. female and is here for a comprehensive physical exam. The patient reports no problems.    Under the care of Endo for thyroid  R>L  Molecular testing negative  States can feel is getting bigger  Just feels tight in neck  No changes in swallowing or breathing as of yet  Mainly right side increasing  Last seen by end July  Watching - not yet followed up    After cycles last 3-4 months  May be perimenopause  Starts with light spotting, light pink x 1 day, skips with gradual increase over 9-10 days  4 days of spots  Severe hot flashes, nights sweats, itching of skin x while  Is using OTCs - vit E, primrose evening oil - has helped  Weight gain  Decreased energy, decreased focus    No pelvic pain or discharge  More dryness with intercourse  Variable to a lot of discharge  Recurrent yeast infection?    Hands and feet blister and peeled - hands --> feet  After covid, rsv  Recently recurred  Occurred at same time of year  Also felt diflucan helped    Jewell tightness inside hands, painful x a few days and stopped  Now feet clearing up  Thick hand cream helped    Treated in UC for sinusitis  Has been on augmentin  Feels more heaviness in chest  Other than abx - tylenol sinus - not in days  Worse at night, laying down on right     History:  LMP: Patient's last menstrual period was 01/05/2025 (approximate). Regular   Menopause at NA years  Last pap date: 10/2022  Abnormal pap? no  Mammogram: 11/2024    Colonoscopy: 11/2022 Cologuard negative    Do you have pain that bothers you in your daily life? no    Review of Systems   Constitutional:  Negative for appetite change, fatigue and unexpected weight change.   HENT:  Positive for congestion. Negative for ear pain and hearing loss.    Eyes:  Negative for visual disturbance.   Respiratory:  Negative for cough and shortness of breath.    Cardiovascular:  Negative for chest pain, palpitations and leg swelling.   Gastrointestinal:  Negative for  abdominal pain, blood in stool, constipation, diarrhea, nausea and vomiting.   Genitourinary:  Negative for dysuria, frequency and urgency.   Musculoskeletal:  Positive for arthralgias. Negative for back pain and myalgias.   Skin:  Negative for rash.   Neurological:  Negative for syncope and headaches.   Psychiatric/Behavioral:  The patient is not nervous/anxious.         Objective   Physical Exam  Vitals and nursing note reviewed.   Constitutional:       General: She is not in acute distress.     Appearance: Normal appearance. She is not toxic-appearing.   HENT:      Head: Normocephalic.      Right Ear: Tympanic membrane, ear canal and external ear normal.      Left Ear: Tympanic membrane, ear canal and external ear normal.      Nose: Nose normal.      Mouth/Throat:      Mouth: Mucous membranes are moist.      Pharynx: Oropharynx is clear.   Eyes:      Conjunctiva/sclera: Conjunctivae normal.   Neck:      Thyroid: Thyromegaly present.   Cardiovascular:      Rate and Rhythm: Normal rate and regular rhythm.      Pulses: Normal pulses.      Heart sounds: Normal heart sounds. No murmur heard.  Pulmonary:      Effort: Pulmonary effort is normal. No respiratory distress.      Breath sounds: Normal breath sounds.   Abdominal:      General: Bowel sounds are normal.      Palpations: Abdomen is soft.      Tenderness: There is no abdominal tenderness.   Genitourinary:     General: Normal vulva.      Exam position: Lithotomy position.      Pubic Area: No rash.       Vagina: Vaginal discharge present.      Cervix: Normal.      Uterus: Normal. Enlarged.       Adnexa: Right adnexa normal and left adnexa normal.   Musculoskeletal:         General: Normal range of motion.      Cervical back: Neck supple.   Lymphadenopathy:      Cervical: No cervical adenopathy.   Skin:     General: Skin is warm and dry.      Capillary Refill: Capillary refill takes less than 2 seconds.      Findings: Rash (palms) present.   Neurological:       General: No focal deficit present.      Gait: Gait normal.   Psychiatric:         Mood and Affect: Mood normal.         Judgment: Judgment normal.         Assessment/Plan   Healthy female exam.     1. Follow up with Endocrinology, surgery for consultation on thyroid enlargement  2. Patient Counseling:  --Nutrition: Stressed importance of moderation in sodium/caffeine intake, saturated fat and cholesterol, caloric balance, sufficient intake of fresh fruits, vegetables, fiber, calcium, iron, and 1 mg of folate supplement per day (for females capable of pregnancy).  --Discussed the issue of estrogen replacement, calcium supplement, and the daily use of baby aspirin.  --Exercise: Stressed the importance of regular exercise.   --Substance Abuse: Discussed cessation/primary prevention of tobacco, alcohol, or other drug use; driving or other dangerous activities under the influence; availability of treatment for abuse.    --Sexuality: Discussed sexually transmitted diseases, partner selection, use of condoms, avoidance of unintended pregnancy  and contraceptive alternatives.   --Injury prevention: Discussed safety belts, safety helmets, smoke detector, smoking near bedding or upholstery.   --Dental health: Discussed importance of regular tooth brushing, flossing, and dental visits.  --Immunizations reviewed.  --Discussed benefits of screening colonoscopy.  --After hours service discussed with patient  3. Discussed the patient's BMI with her.  The BMI is above average. The patient received Provided instructions on dietary changes  Provided instructions on exercise  Advised to Increase physical activity because they have an above normal BMI.  4. Follow up next physical in 1 year    Patient Instructions   It is important that you have yearly check-ups with your healthcare provider to ensure that you stay up to date on your health, screenings, and vaccinations, even if you feel healthy.     Make sure you eat a diet rich in  fruits, vegetables, nuts, beans, whole grains, lean meat, eggs, calcium, and good fats (i.e. olive oil, avocado baked fish). AVOID a diet that is high in red meat, trans- and saturated fats, sweetened beverages, and refined grains (i.e. white bread, rice, sweetened cereals).     Stay hydrated with at least 64 ounces of water daily.    Exercise most days per week, for at least 30-45 minutes per session.     Be sure to wear sunscreen of SPF of 15 or higher if you are going to be outside.    Stress management and coping skills are important to manage our stress levels. Some tips include keep in mind that stress isn't a bad thing, talk about your problems, prioritize your responsibilities, focus on the basics, don't put all your eggs in one basket, set aside time for yourself, and keep things in perspective.     Stay up to date with annual eye exams and twice yearly dental exams.    Recommend annual flu vaccination, in addition to age appropriate recommended vaccines.    PAP smear is recommended every 2-3 years for women 21-29 and every 3-5 years for women 30-65.  Updated in office today    Annual mammogram is recommended for women 40-75  UTD    Colon cancer screening based on age, family history, and other risk factors.  Due in November    Treat for BV today  If cycles not improving, can be related to uterine fibroids    Fasting blood work today    Refer to Dr. Easley to consult on goiter options    Follow up 6 months for evaluation of therapy, sooner as needed

## 2025-02-05 ENCOUNTER — HOSPITAL ENCOUNTER (OUTPATIENT)
Dept: RADIOLOGY | Facility: CLINIC | Age: 50
Discharge: HOME | End: 2025-02-05
Payer: COMMERCIAL

## 2025-02-05 DIAGNOSIS — E04.2 MULTINODULAR GOITER: ICD-10-CM

## 2025-02-05 LAB
25(OH)D3+25(OH)D2 SERPL-MCNC: 32 NG/ML (ref 30–100)
ALBUMIN SERPL-MCNC: 4.7 G/DL (ref 3.6–5.1)
ALP SERPL-CCNC: 60 U/L (ref 31–125)
ALT SERPL-CCNC: 7 U/L (ref 6–29)
ANION GAP SERPL CALCULATED.4IONS-SCNC: 11 MMOL/L (CALC) (ref 7–17)
AST SERPL-CCNC: 14 U/L (ref 10–35)
BASOPHILS # BLD AUTO: 29 CELLS/UL (ref 0–200)
BASOPHILS NFR BLD AUTO: 0.9 %
BILIRUB SERPL-MCNC: 0.3 MG/DL (ref 0.2–1.2)
BUN SERPL-MCNC: 13 MG/DL (ref 7–25)
CALCIUM SERPL-MCNC: 9.6 MG/DL (ref 8.6–10.2)
CHLORIDE SERPL-SCNC: 105 MMOL/L (ref 98–110)
CHOLEST SERPL-MCNC: 189 MG/DL
CHOLEST/HDLC SERPL: 2.1 (CALC)
CO2 SERPL-SCNC: 23 MMOL/L (ref 20–32)
CREAT SERPL-MCNC: 0.8 MG/DL (ref 0.5–0.99)
EGFRCR SERPLBLD CKD-EPI 2021: 91 ML/MIN/1.73M2
EOSINOPHIL # BLD AUTO: 29 CELLS/UL (ref 15–500)
EOSINOPHIL NFR BLD AUTO: 0.9 %
ERYTHROCYTE [DISTWIDTH] IN BLOOD BY AUTOMATED COUNT: 14.4 % (ref 11–15)
EST. AVERAGE GLUCOSE BLD GHB EST-MCNC: 120 MG/DL
EST. AVERAGE GLUCOSE BLD GHB EST-SCNC: 6.6 MMOL/L
FERRITIN SERPL-MCNC: 4 NG/ML (ref 16–232)
GLUCOSE SERPL-MCNC: 103 MG/DL (ref 65–99)
HBA1C MFR BLD: 5.8 % OF TOTAL HGB
HCT VFR BLD AUTO: 42 % (ref 35–45)
HDLC SERPL-MCNC: 89 MG/DL
HGB BLD-MCNC: 12.9 G/DL (ref 11.7–15.5)
LDLC SERPL CALC-MCNC: 82 MG/DL (CALC)
LYMPHOCYTES # BLD AUTO: 1178 CELLS/UL (ref 850–3900)
LYMPHOCYTES NFR BLD AUTO: 36.8 %
MCH RBC QN AUTO: 24.9 PG (ref 27–33)
MCHC RBC AUTO-ENTMCNC: 30.7 G/DL (ref 32–36)
MCV RBC AUTO: 81.1 FL (ref 80–100)
MONOCYTES # BLD AUTO: 333 CELLS/UL (ref 200–950)
MONOCYTES NFR BLD AUTO: 10.4 %
NEUTROPHILS # BLD AUTO: 1632 CELLS/UL (ref 1500–7800)
NEUTROPHILS NFR BLD AUTO: 51 %
NONHDLC SERPL-MCNC: 100 MG/DL (CALC)
PLATELET # BLD AUTO: 402 THOUSAND/UL (ref 140–400)
PMV BLD REES-ECKER: 12.8 FL (ref 7.5–12.5)
POTASSIUM SERPL-SCNC: 4.5 MMOL/L (ref 3.5–5.3)
PROT SERPL-MCNC: 7.1 G/DL (ref 6.1–8.1)
RBC # BLD AUTO: 5.18 MILLION/UL (ref 3.8–5.1)
SODIUM SERPL-SCNC: 139 MMOL/L (ref 135–146)
T3 SERPL-MCNC: 111 NG/DL (ref 76–181)
T3FREE SERPL-MCNC: 3.3 PG/ML (ref 2.3–4.2)
T4 FREE SERPL-MCNC: 1.1 NG/DL (ref 0.8–1.8)
TRIGL SERPL-MCNC: 85 MG/DL
TSH SERPL-ACNC: 0.76 MIU/L
VIT B12 SERPL-MCNC: 418 PG/ML (ref 200–1100)
WBC # BLD AUTO: 3.2 THOUSAND/UL (ref 3.8–10.8)

## 2025-02-05 PROCEDURE — 76536 US EXAM OF HEAD AND NECK: CPT

## 2025-02-12 DIAGNOSIS — T36.95XA ANTIBIOTIC-INDUCED YEAST INFECTION: Primary | ICD-10-CM

## 2025-02-12 DIAGNOSIS — B37.9 ANTIBIOTIC-INDUCED YEAST INFECTION: Primary | ICD-10-CM

## 2025-02-12 RX ORDER — B1/B2/B3/B5/B6/IRON/METH/CHOLN 2.5-18/15
LIQUID (ML) ORAL
COMMUNITY

## 2025-02-12 RX ORDER — FLUCONAZOLE 150 MG/1
150 TABLET ORAL ONCE
Qty: 1 TABLET | Refills: 0 | Status: SHIPPED | OUTPATIENT
Start: 2025-02-12 | End: 2025-02-12

## 2025-02-12 ASSESSMENT — ENCOUNTER SYMPTOMS: ARTHRALGIAS: 1

## 2025-02-14 LAB
CYTOLOGY CMNT CVX/VAG CYTO-IMP: NORMAL
HPV HR 12 DNA GENITAL QL NAA+PROBE: NEGATIVE
HPV HR GENOTYPES PNL CVX NAA+PROBE: NEGATIVE
HPV16 DNA SPEC QL NAA+PROBE: NEGATIVE
HPV18 DNA SPEC QL NAA+PROBE: NEGATIVE
LAB AP HPV GENOTYPE QUESTION: YES
LAB AP HPV HR: NORMAL
LABORATORY COMMENT REPORT: NORMAL
LMP START DATE: NORMAL
MENSTRUAL HX REPORTED: NORMAL
PATH REPORT.TOTAL CANCER: NORMAL

## 2025-02-26 ENCOUNTER — APPOINTMENT (OUTPATIENT)
Dept: SURGERY | Facility: CLINIC | Age: 50
End: 2025-02-26
Payer: COMMERCIAL

## 2025-02-26 VITALS
BODY MASS INDEX: 36.39 KG/M2 | DIASTOLIC BLOOD PRESSURE: 82 MMHG | SYSTOLIC BLOOD PRESSURE: 131 MMHG | WEIGHT: 212 LBS | HEART RATE: 86 BPM | RESPIRATION RATE: 20 BRPM

## 2025-02-26 DIAGNOSIS — E04.1 THYROID NODULE: Primary | ICD-10-CM

## 2025-02-26 PROCEDURE — 3075F SYST BP GE 130 - 139MM HG: CPT | Performed by: SURGERY

## 2025-02-26 PROCEDURE — 99205 OFFICE O/P NEW HI 60 MIN: CPT | Performed by: SURGERY

## 2025-02-26 PROCEDURE — 3079F DIAST BP 80-89 MM HG: CPT | Performed by: SURGERY

## 2025-02-26 ASSESSMENT — ENCOUNTER SYMPTOMS
EYES NEGATIVE: 1
RESPIRATORY NEGATIVE: 1
PSYCHIATRIC NEGATIVE: 1
NEUROLOGICAL NEGATIVE: 1
TROUBLE SWALLOWING: 1
CONSTITUTIONAL NEGATIVE: 1
CARDIOVASCULAR NEGATIVE: 1

## 2025-02-26 ASSESSMENT — PAIN SCALES - GENERAL: PAINLEVEL_OUTOF10: 0-NO PAIN

## 2025-02-26 NOTE — LETTER
February 26, 2025     LUKE Acevedo-CNP  [address]  [address]  [facility] [address]    Patient: Radha Mckeon   YOB: 1975   Date of Visit: 2/26/2025       Dear Dr. Suzanne Uriarte, LUKE-CNP:    Thank you for referring Radha Mckeon to me for evaluation. Below are my notes for this consultation.  If you have questions, please do not hesitate to call me. I look forward to following your patient along with you.       Sincerely,     Reginaldo Easley MD      CC: No Recipients  ______________________________________________________________________________________    Subjective  Patient ID: Radha Mckeon is a 49 y.o. female who presents for second opinion on nodular thyroid disease..    HPI I saw Mrs. Mckeon in surgery clinic today.  She was referred by her primary care physician for second opinion of her nodular thyroid disease.  Patient has been following with Dr. Hillary Roy of medical endocrinology here at [facility].    Patient has had a known history of nodular thyroid disease.  This looks like it dates back to an ultrasound from around 2021.  Last year in June based on an ultrasound done here at [facility] the patient underwent ultrasound-guided biopsy of a 2.7 cm right sided TI-RADS 3 lesion.  This came back Gird[place] class IV is for full neoplasm.  Dr. Roy sent her out for additional genetic testing which all came back negative indicating a benign nodule.    She underwent a recent follow-up thyroid ultrasound February 5, 2025.  The nodule was minimally changed from 2.7 cm to 2.9 cm in maximum dimension which I would not consider any significant growth and can be within measurement tolerance.  Given her prior benign biopsy this is not concerning.    She reports some intermittent discomfort with swallowing.  She does also have gastroesophageal reflux disease.  Given the overall size of her thyroid and her nodule I do not think that her thyroid is contributing  significantly to this.  No respiratory symptoms.  Her voice is normal.  No personal history of head neck or chest radiation exposure.    She did mention that her father had partial thyroidectomy and sister had total thyroidectomy.  She is not sure what these were done for.  She does not think either 1 had cancer.  She said both of her relatives are alive.  I told her I would recommend she talk to them and clarify the reason that they had their surgery.  That could be useful information.    Review of Systems   Constitutional: Negative.    HENT:  Positive for trouble swallowing.    Eyes: Negative.    Respiratory: Negative.     Cardiovascular: Negative.    Neurological: Negative.    Psychiatric/Behavioral: Negative.         Objective  Physical Exam  Vitals reviewed.   Constitutional:       Appearance: Normal appearance.   Eyes:      Comments: No proptosis   Neck:      Vascular: No carotid bruit.      Comments: Visually she has some fullness in the right thyroid lobe with her neck extended.  On palpation I can feel about a 2-1/2 cm palpable nodule in the right thyroid lobe that moves easily upon swallowing.  No localized fixation.  Trachea midline.  Left lobe feels normal.  Cardiovascular:      Rate and Rhythm: Normal rate and regular rhythm.      Heart sounds: Normal heart sounds.   Pulmonary:      Effort: Pulmonary effort is normal. No respiratory distress.      Breath sounds: Normal breath sounds. No wheezing or rales.   Musculoskeletal:         General: Normal range of motion.   Lymphadenopathy:      Cervical: No cervical adenopathy.   Neurological:      General: No focal deficit present.      Mental Status: She is alert and oriented to person, place, and time.   Psychiatric:         Mood and Affect: Mood normal.         Behavior: Behavior normal.          THYROID;  2/5/2025 9:44 am      INDICATION:  Signs/Symptoms:Thyroid nodule enlarging.      ,E04.2 Nontoxic multinodular goiter      COMPARISON:  06/26/2024       ACCESSION NUMBER(S):  [id number]      ORDERING CLINICIAN:  ALBERT PERALES      TECHNIQUE:  Multiple ultrasonographic images of the thyroid gland were obtained.      FINDINGS:          RIGHT LOBE:  The right lobe of the thyroid measures 2.7 cm x 2.5 cm x 5.3 cm. The  right lobe of the thyroid is homogeneous in echotexture and  demonstrates a dominant hypoechoic solid nodule. This is  approximately 29 x 17 x 28 mm. This is TR 4 and minimally larger  since the prior exam.      LEFT LOBE:  The left lobe of the thyroid measures 1.8 cm x 1.7 cm x 4.6 cm. The  left lobe of the thyroid is homogeneous in echotexture and  demonstrates no nodules.      ISTHMUS:  The isthmus measures approximately 0.3 cm and is homogeneous in  echotexture and without any identifiable nodules.      CERVICAL LYMPH NODES:  No significant adenopathy.      IMPRESSION:  Dominant TR 4 right lobe thyroid nodule, unchanged to minimally  larger since the prior exam.      Please note that these statements are based on the recommendations of  the American College of [employer]    Assessment/Plan   Mrs. Mckeon has a known history of nodular thyroid disease dating back to about 2021.  Last June she was seen by medical endocrinology and underwent ultrasound-guided biopsy of the right sided thyroid nodule.  Biopsy and genetic testing confirmed that this was a benign nodule.    She just had a recent follow-up ultrasound which shows 2 mm of change in her nodule.  I would not consider this anything for significant growth.  That is well within measurement tolerance.    On palpation her nodule moves easily upon swallowing there is no tracheal deviation.  I do not think that her nodule size or overall thyroid size would be enough to be contributing to any dysphagia issues for her.  This is more likely related to her gastroesophageal reflux disease.    Plan    1.  I told her she should continue to follow with her medical endocrinologist.  I do not see any indication  for surgery at this time.    2.  I would recommend that she get at least an annual ultrasound to monitor her nodule.    3.  We did discuss that if she had ongoing growth of her nodule and worsening symptoms, she might have a couple potential options.  1 would be surgical removal with a right thyroid lobectomy.  We also discussed a newer technique known as radiofrequency ablation which is done in interventional radiology to cook the nodule.  We are generally able to get about 50 to 80% average decrease in nodular size with this technique.    She is comfortable with the observation plan for now.         Reginaldo Easley MD 02/26/25 11:07 AM

## 2025-02-26 NOTE — PROGRESS NOTES
Subjective   Patient ID: Radha Mckeon is a 49 y.o. female who presents for second opinion on nodular thyroid disease..    HPI I saw Mrs. Mckeon in surgery clinic today.  She was referred by her primary care physician for second opinion of her nodular thyroid disease.  Patient has been following with Dr. Hillary Roy of medical endocrinology here at [facility].    Patient has had a known history of nodular thyroid disease.  This looks like it dates back to an ultrasound from around 2021.  Last year in June based on an ultrasound done here at [facility] the patient underwent ultrasound-guided biopsy of a 2.7 cm right sided TI-RADS 3 lesion.  This came back [place] class IV is for full neoplasm.  Dr. Roy sent her out for additional genetic testing which all came back negative indicating a benign nodule.    She underwent a recent follow-up thyroid ultrasound February 5, 2025.  The nodule was minimally changed from 2.7 cm to 2.9 cm in maximum dimension which I would not consider any significant growth and can be within measurement tolerance.  Given her prior benign biopsy this is not concerning.    She reports some intermittent discomfort with swallowing.  She does also have gastroesophageal reflux disease.  Given the overall size of her thyroid and her nodule I do not think that her thyroid is contributing significantly to this.  No respiratory symptoms.  Her voice is normal.  No personal history of head neck or chest radiation exposure.    She did mention that her father had partial thyroidectomy and sister had total thyroidectomy.  She is not sure what these were done for.  She does not think either 1 had cancer.  She said both of her relatives are alive.  I told her I would recommend she talk to them and clarify the reason that they had their surgery.  That could be useful information.    Review of Systems   Constitutional: Negative.    HENT:  Positive for trouble swallowing.     Eyes: Negative.    Respiratory: Negative.     Cardiovascular: Negative.    Neurological: Negative.    Psychiatric/Behavioral: Negative.         Objective   Physical Exam  Vitals reviewed.   Constitutional:       Appearance: Normal appearance.   Eyes:      Comments: No proptosis   Neck:      Vascular: No carotid bruit.      Comments: Visually she has some fullness in the right thyroid lobe with her neck extended.  On palpation I can feel about a 2-1/2 cm palpable nodule in the right thyroid lobe that moves easily upon swallowing.  No localized fixation.  Trachea midline.  Left lobe feels normal.  Cardiovascular:      Rate and Rhythm: Normal rate and regular rhythm.      Heart sounds: Normal heart sounds.   Pulmonary:      Effort: Pulmonary effort is normal. No respiratory distress.      Breath sounds: Normal breath sounds. No wheezing or rales.   Musculoskeletal:         General: Normal range of motion.   Lymphadenopathy:      Cervical: No cervical adenopathy.   Neurological:      General: No focal deficit present.      Mental Status: She is alert and oriented to person, place, and time.   Psychiatric:         Mood and Affect: Mood normal.         Behavior: Behavior normal.         US THYROID;  2/5/2025 9:44 am      INDICATION:  Signs/Symptoms:Thyroid nodule enlarging.      ,E04.2 Nontoxic multinodular goiter      COMPARISON:  06/26/2024      ACCESSION NUMBER(S):  [id number]      ORDERING CLINICIAN:  ALBERT PERALES      TECHNIQUE:  Multiple ultrasonographic images of the thyroid gland were obtained.      FINDINGS:          RIGHT LOBE:  The right lobe of the thyroid measures 2.7 cm x 2.5 cm x 5.3 cm. The  right lobe of the thyroid is homogeneous in echotexture and  demonstrates a dominant hypoechoic solid nodule. This is  approximately 29 x 17 x 28 mm. This is TR 4 and minimally larger  since the prior exam.      LEFT LOBE:  The left lobe of the thyroid measures 1.8 cm x 1.7 cm x 4.6 cm. The  left lobe of the thyroid  is homogeneous in echotexture and  demonstrates no nodules.      ISTHMUS:  The isthmus measures approximately 0.3 cm and is homogeneous in  echotexture and without any identifiable nodules.      CERVICAL LYMPH NODES:  No significant adenopathy.      IMPRESSION:  Dominant TR 4 right lobe thyroid nodule, unchanged to minimally  larger since the prior exam.      Please note that these statements are based on the recommendations of  the [employer]    Assessment/Plan    Mrs. Mckeon has a known history of nodular thyroid disease dating back to about 2021.  Last June she was seen by medical endocrinology and underwent ultrasound-guided biopsy of the right sided thyroid nodule.  Biopsy and genetic testing confirmed that this was a benign nodule.    She just had a recent follow-up ultrasound which shows 2 mm of change in her nodule.  I would not consider this anything for significant growth.  That is well within measurement tolerance.    On palpation her nodule moves easily upon swallowing there is no tracheal deviation.  I do not think that her nodule size or overall thyroid size would be enough to be contributing to any dysphagia issues for her.  This is more likely related to her gastroesophageal reflux disease.    Plan    1.  I told her she should continue to follow with her medical endocrinologist.  I do not see any indication for surgery at this time.    2.  I would recommend that she get at least an annual ultrasound to monitor her nodule.    3.  We did discuss that if she had ongoing growth of her nodule and worsening symptoms, she might have a couple potential options.  1 would be surgical removal with a right thyroid lobectomy.  We also discussed a newer technique known as radiofrequency ablation which is done in interventional radiology to cook the nodule.  We are generally able to get about 50 to 80% average decrease in nodular size with this technique.    She is comfortable with the  observation plan for now.         Reginaldo Easley MD 02/26/25 11:07 AM

## 2025-05-14 DIAGNOSIS — F40.243 ANXIETY WITH FLYING: Primary | ICD-10-CM

## 2025-05-14 RX ORDER — ALPRAZOLAM 0.5 MG/1
0.5 TABLET ORAL AS NEEDED
Qty: 10 TABLET | Refills: 0 | Status: SHIPPED | OUTPATIENT
Start: 2025-05-14 | End: 2025-06-13

## 2026-02-04 ENCOUNTER — APPOINTMENT (OUTPATIENT)
Dept: PRIMARY CARE | Facility: CLINIC | Age: 51
End: 2026-02-04
Payer: COMMERCIAL